# Patient Record
Sex: FEMALE | Race: WHITE | URBAN - METROPOLITAN AREA
[De-identification: names, ages, dates, MRNs, and addresses within clinical notes are randomized per-mention and may not be internally consistent; named-entity substitution may affect disease eponyms.]

---

## 2017-08-12 ENCOUNTER — OFFICE VISIT (OUTPATIENT)
Dept: URGENT CARE | Facility: URGENT CARE | Age: 47
End: 2017-08-12
Payer: COMMERCIAL

## 2017-08-12 VITALS
DIASTOLIC BLOOD PRESSURE: 75 MMHG | TEMPERATURE: 98.1 F | BODY MASS INDEX: 20.89 KG/M2 | HEIGHT: 66 IN | OXYGEN SATURATION: 99 % | SYSTOLIC BLOOD PRESSURE: 122 MMHG | WEIGHT: 130 LBS | HEART RATE: 87 BPM

## 2017-08-12 DIAGNOSIS — M54.41 ACUTE RIGHT-SIDED LOW BACK PAIN WITH RIGHT-SIDED SCIATICA: Primary | ICD-10-CM

## 2017-08-12 PROCEDURE — 99203 OFFICE O/P NEW LOW 30 MIN: CPT | Performed by: PHYSICIAN ASSISTANT

## 2017-08-12 RX ORDER — METHYLPREDNISOLONE 4 MG
TABLET, DOSE PACK ORAL
Qty: 21 TABLET | Refills: 0 | Status: SHIPPED | OUTPATIENT
Start: 2017-08-12

## 2017-08-12 RX ORDER — TRAMADOL HYDROCHLORIDE 50 MG/1
50-100 TABLET ORAL EVERY 6 HOURS PRN
Qty: 20 TABLET | Refills: 0 | Status: SHIPPED | OUTPATIENT
Start: 2017-08-12

## 2017-08-12 NOTE — NURSING NOTE
"Chief Complaint   Patient presents with     Urgent Care     Back Pain     c/o back pain for 1 day       Initial /75  Pulse 87  Temp 98.1  F (36.7  C) (Tympanic)  Ht 5' 6\" (1.676 m)  Wt 130 lb (59 kg)  LMP 07/29/2017  SpO2 99%  Breastfeeding? No  BMI 20.98 kg/m2 Estimated body mass index is 20.98 kg/(m^2) as calculated from the following:    Height as of this encounter: 5' 6\" (1.676 m).    Weight as of this encounter: 130 lb (59 kg).  Medication Reconciliation: complete   Janny Mohan.MA    "

## 2017-08-12 NOTE — MR AVS SNAPSHOT
"              After Visit Summary   2017    Lauryn Raya    MRN: [id number]           Patient Information     Date Of Birth          1970        Visit Information        Provider Department      2017 6:05 PM Vanessa Guillen PA-C [facility]        Today's Diagnoses     Acute right-sided low back pain with right-sided sciatica    -  1       Follow-ups after your visit        Who to contact     If you have questions or need follow up information about today's clinic visit or your schedule please contact [facility] directly at [phone].  Normal or non-critical lab and imaging results will be communicated to you by Moov cc.hart, letter or phone within 4 business days after the clinic has received the results. If you do not hear from us within 7 days, please contact the clinic through Moov cc.hart or phone. If you have a critical or abnormal lab result, we will notify you by phone as soon as possible.  Submit refill requests through coJuvo or call your pharmacy and they will forward the refill request to us. Please allow 3 business days for your refill to be completed.          Additional Information About Your Visit        MyChart Information     coJuvo lets you send messages to your doctor, view your test results, renew your prescriptions, schedule appointments and more. To sign up, go to [email or web address] . Click on \"Log in\" on the left side of the screen, which will take you to the Welcome page. Then click on \"Sign up Now\" on the right side of the page.     You will be asked to enter the access code listed below, as well as some personal information. Please follow the directions to create your username and password.     Your access code is: [email or web address]  Expires: 11/10/2017  6:42 PM     Your access code will  in 90 days. If you need help or a new code, please call your [facility] clinic or [phone].        Care EveryWhere ID     This is " "your Care EveryWhere ID. This could be used by other organizations to access your [facility] medical records  [id number]        Your Vitals Were     Pulse Temperature Height Last Period Pulse Oximetry Breastfeeding?    87 98.1  F (36.7  C) (Tympanic) 5' 6\" (1.676 m) 07/29/2017 99% No    BMI (Body Mass Index)                   20.98 kg/m2            Blood Pressure from Last 3 Encounters:   08/12/17 122/75   02/11/05 110/60    Weight from Last 3 Encounters:   08/12/17 130 lb (59 kg)   02/11/05 123 lb (55.8 kg)              Today, you had the following     No orders found for display         Today's Medication Changes          These changes are accurate as of: 8/12/17  6:42 PM.  If you have any questions, ask your nurse or doctor.               Start taking these medicines.        Dose/Directions    methylPREDNISolone 4 MG tablet   Commonly known as:  MEDROL DOSEPAK   Used for:  Acute right-sided low back pain with right-sided sciatica   Started by:  Vanessa Guillen PA-C        Follow package instructions   Quantity:  21 tablet   Refills:  0       traMADol 50 MG tablet   Commonly known as:  ULTRAM   Used for:  Acute right-sided low back pain with right-sided sciatica   Started by:  Vanessa Guillen PA-C        Dose:   mg   Take 1-2 tablets ( mg) by mouth every 6 hours as needed for pain maximum 8 tablet(s) per day   Quantity:  20 tablet   Refills:  0            Where to get your medicines      [id number] - [place], [place] - [address] AT [facility] [address]  [facility] [address], [place] [address]    Hours:  24-hours Phone:  [phone]     methylPREDNISolone 4 MG tablet         Some of these will need a paper prescription and others can be bought over the counter.  Ask your nurse if you have questions.     Bring a paper prescription for each of these medications     traMADol 50 MG tablet                Primary Care Provider    None " Specified       No primary provider on file.        Equal Access to Services     LAMINE SCHMITT : Hadii aad ku hadcynthiacalvin Dominguez, waalexandriada brandonhernanha, qarobertta sirishamacharli krause. So [facility] [phone].    ATENCIÓN: Si habla español, tiene a hawk disposición servicios gratuitos de asistencia lingüística. Llame al [phone].    We comply with applicable federal civil rights laws and [place] laws. We do not discriminate on the basis of race, color, national origin, age, disability sex, sexual orientation or gender identity.            Thank you!     Thank you for choosing [facility]  for your care. Our goal is always to provide you with excellent care. Hearing back from our patients is one way we can continue to improve our services. Please take a few minutes to complete the written survey that you may receive in the mail after your visit with us. Thank you!             Your Updated Medication List - Protect others around you: Learn how to safely use, store and throw away your medicines at www.disposemymeds.org.          This list is accurate as of: 8/12/17  6:42 PM.  Always use your most recent med list.                   Brand Name Dispense Instructions for use Diagnosis    methylPREDNISolone 4 MG tablet    MEDROL DOSEPAK    21 tablet    Follow package instructions    Acute right-sided low back pain with right-sided sciatica       PROZAC PO           traMADol 50 MG tablet    ULTRAM    20 tablet    Take 1-2 tablets ( mg) by mouth every 6 hours as needed for pain maximum 8 tablet(s) per day    Acute right-sided low back pain with right-sided sciatica

## 2017-08-12 NOTE — PROGRESS NOTES
HPI:  Lauryn is a 45 yo female who presents for lower right back pain with radiation down right leg into calf x yesterday.  Patient has been moving and lifting boxes. She is employed as a  and last night it was very painful to stand.  Pain bearable controllable last night with ibuprofen but worse today with radiation down leg.  Pain is both sharp and achy.  Pain is better sitting.  Worse when standing or getting up from sitting. Back is achy when lying down.  Denies numbness or tingling.    ROS:  See HPI      PE:  Vitals & nursing notes reviewed. .B/P: 122/75, T: 98.1, P: 87, R: Data Unavailable  Constitutional:  Alert, well nourished, well-developed, NAD  Back:  TTP on right lower lumbar paraspinal muscles.   Pain with standing from sitting  Calf strength:  5/5 BL  Tibialis Anterior strength:  5/5 BL  EHL strength:  4+/5 Rt;  5/5 LT  Leg Raise test:  (+) on right side   Negative on left  Heel /Toe walk:  No foot flop appreciated      ASSESSMENT:  (M54.41) Acute right-sided low back pain with sx of right-sided sciatica.  Comment: (+) right leg raise test and RT EHL showed some minor weakness compared to  LT.    Plan: methylPREDNISolone (MEDROL DOSEPAK) 4 MG         tablet, traMADol (ULTRAM) 50 MG tablet        ICE 20 minutes on, 1 hour off.  Ibuprofen 600-800 mg q 4-6 hrs PRN pain (take with food).  Save Tramadol for severe pain or pain at night.   Rest back for 1-2 days then begin gentle back stretching exercises. (Demonstrated to patient).  If sx do not improve, may require PT.  FU with PCP or ortho if no improvement in 7 days for further eval and possible imaging, sooner if worsens.

## 2017-08-16 ENCOUNTER — SURGERY - HEALTHEAST (OUTPATIENT)
Dept: SURGERY | Facility: HOSPITAL | Age: 47
End: 2017-08-16

## 2017-08-16 ENCOUNTER — ANESTHESIA - HEALTHEAST (OUTPATIENT)
Dept: SURGERY | Facility: HOSPITAL | Age: 47
End: 2017-08-16

## 2017-08-16 ENCOUNTER — HOSPITAL ENCOUNTER (INPATIENT)
Dept: MEDSURG UNIT | Facility: HOSPITAL | Age: 47
Discharge: NURSING, REHAB OR LONG-TERM CARE | End: 2017-08-20
Attending: ORTHOPAEDIC SURGERY | Admitting: INTERNAL MEDICINE
Payer: COMMERCIAL

## 2017-08-16 DIAGNOSIS — K21.9 GASTROESOPHAGEAL REFLUX DISEASE WITHOUT ESOPHAGITIS: ICD-10-CM

## 2017-08-16 DIAGNOSIS — D64.9 ANEMIA, UNSPECIFIED TYPE: ICD-10-CM

## 2017-08-16 DIAGNOSIS — D72.829 LEUKOCYTOSIS: ICD-10-CM

## 2017-08-16 DIAGNOSIS — E87.1 HYPONATREMIA: ICD-10-CM

## 2017-08-16 DIAGNOSIS — M00.9 SEPTIC ARTHRITIS (H): ICD-10-CM

## 2017-08-16 DIAGNOSIS — L03.90 CELLULITIS: ICD-10-CM

## 2017-08-16 DIAGNOSIS — M13.0 POLYARTHROPATHY: ICD-10-CM

## 2017-08-16 DIAGNOSIS — M00.9 SEPTIC JOINT (H): ICD-10-CM

## 2017-08-16 LAB
ATRIAL RATE - MUSE: 102 BPM
DIASTOLIC BLOOD PRESSURE - MUSE: NORMAL MMHG
INTERPRETATION ECG - MUSE: NORMAL
P AXIS - MUSE: 58 DEGREES
PR INTERVAL - MUSE: 158 MS
QRS DURATION - MUSE: 94 MS
QT - MUSE: 310 MS
QTC - MUSE: 404 MS
R AXIS - MUSE: 58 DEGREES
SYSTOLIC BLOOD PRESSURE - MUSE: NORMAL MMHG
T AXIS - MUSE: 54 DEGREES
VENTRICULAR RATE- MUSE: 102 BPM

## 2017-08-16 ASSESSMENT — MIFFLIN-ST. JEOR
SCORE: 1250.03
SCORE: 1250.03

## 2017-08-17 LAB
BSA FOR ECHO PROCEDURE: 1.67 M2
CV BLOOD PRESSURE: NORMAL MMHG
CV ECHO HEIGHT: 66 IN
CV ECHO WEIGHT: 133 LBS
HIV 1+2 AB+HIV1 P24 AG SERPL QL IA: NEGATIVE
LEFT VENTRICLE HEART RATE: 75 BPM
NUC REST DIASTOLIC VOLUME INDEX: 2128 LBS
NUC REST SYSTOLIC VOLUME INDEX: 66 IN

## 2017-08-20 LAB
BLD PROD TYP BPU: NORMAL
BLOOD EXPIRATION DATE: NORMAL
BLOOD TYPE: 6200
CODING SYSTEM: NORMAL
COMPONENT (HISTORICAL CONVERSION): NORMAL
CROSSMATCH: NORMAL
ISSUE DATE AND TIME: NORMAL
STATUS (HISTORICAL CONVERSION): NORMAL
UNIT ABO/RH (HISTORICAL CONVERSION): NORMAL
UNIT NUMBER: NORMAL

## 2017-08-21 ENCOUNTER — OFFICE VISIT - HEALTHEAST (OUTPATIENT)
Dept: GERIATRICS | Facility: CLINIC | Age: 47
End: 2017-08-21

## 2017-08-21 DIAGNOSIS — A41.9 SEPSIS, DUE TO UNSPECIFIED ORGANISM: ICD-10-CM

## 2017-08-21 DIAGNOSIS — R53.1 WEAKNESS: ICD-10-CM

## 2017-08-21 DIAGNOSIS — M00.171: ICD-10-CM

## 2017-08-21 DIAGNOSIS — G93.40 ACUTE ENCEPHALOPATHY: ICD-10-CM

## 2017-08-21 DIAGNOSIS — D50.9 IRON DEFICIENCY ANEMIA, UNSPECIFIED IRON DEFICIENCY ANEMIA TYPE: ICD-10-CM

## 2017-08-23 ENCOUNTER — OFFICE VISIT - HEALTHEAST (OUTPATIENT)
Dept: GERIATRICS | Facility: CLINIC | Age: 47
End: 2017-08-23

## 2017-08-23 DIAGNOSIS — D50.9 IRON DEFICIENCY ANEMIA, UNSPECIFIED IRON DEFICIENCY ANEMIA TYPE: ICD-10-CM

## 2017-08-23 DIAGNOSIS — A41.9 SEPSIS, DUE TO UNSPECIFIED ORGANISM: ICD-10-CM

## 2017-08-23 DIAGNOSIS — G93.40 ACUTE ENCEPHALOPATHY: ICD-10-CM

## 2017-08-23 DIAGNOSIS — R53.1 WEAKNESS: ICD-10-CM

## 2017-08-25 ENCOUNTER — RECORDS - HEALTHEAST (OUTPATIENT)
Dept: LAB | Facility: CLINIC | Age: 47
End: 2017-08-25

## 2017-08-25 ENCOUNTER — OFFICE VISIT - HEALTHEAST (OUTPATIENT)
Dept: GERIATRICS | Facility: CLINIC | Age: 47
End: 2017-08-25

## 2017-08-25 DIAGNOSIS — R53.1 WEAKNESS: ICD-10-CM

## 2017-08-25 DIAGNOSIS — E83.42 HYPOMAGNESEMIA: ICD-10-CM

## 2017-08-25 DIAGNOSIS — E46 MALNUTRITION (H): ICD-10-CM

## 2017-08-25 DIAGNOSIS — F51.02 ADJUSTMENT INSOMNIA: ICD-10-CM

## 2017-08-25 LAB
AST SERPL W P-5'-P-CCNC: 37 U/L (ref 0–40)
CREAT SERPL-MCNC: 0.81 MG/DL (ref 0.6–1.1)
GFR SERPL CREATININE-BSD FRML MDRD: >60 ML/MIN/1.73M2

## 2017-08-28 ENCOUNTER — RECORDS - HEALTHEAST (OUTPATIENT)
Dept: ADMINISTRATIVE | Facility: OTHER | Age: 47
End: 2017-08-28

## 2017-08-28 ENCOUNTER — OFFICE VISIT - HEALTHEAST (OUTPATIENT)
Dept: GERIATRICS | Facility: CLINIC | Age: 47
End: 2017-08-28

## 2017-08-28 DIAGNOSIS — D50.9 IRON DEFICIENCY ANEMIA, UNSPECIFIED IRON DEFICIENCY ANEMIA TYPE: ICD-10-CM

## 2017-08-28 DIAGNOSIS — M00.171: ICD-10-CM

## 2017-08-28 DIAGNOSIS — E46 MALNUTRITION (H): ICD-10-CM

## 2017-08-28 DIAGNOSIS — R53.1 WEAKNESS: ICD-10-CM

## 2017-08-30 ENCOUNTER — OFFICE VISIT - HEALTHEAST (OUTPATIENT)
Dept: GERIATRICS | Facility: CLINIC | Age: 47
End: 2017-08-30

## 2017-08-30 DIAGNOSIS — D64.9 ANEMIA, UNSPECIFIED TYPE: ICD-10-CM

## 2017-08-30 DIAGNOSIS — A41.9 SEPSIS, DUE TO UNSPECIFIED ORGANISM: ICD-10-CM

## 2017-08-30 DIAGNOSIS — F32.A DEPRESSION: ICD-10-CM

## 2017-08-30 DIAGNOSIS — E46 MALNUTRITION (H): ICD-10-CM

## 2017-08-30 DIAGNOSIS — M00.171: ICD-10-CM

## 2017-08-30 DIAGNOSIS — R53.1 WEAKNESS: ICD-10-CM

## 2017-08-30 DIAGNOSIS — E83.42 HYPOMAGNESEMIA: ICD-10-CM

## 2017-08-30 DIAGNOSIS — F51.02 ADJUSTMENT INSOMNIA: ICD-10-CM

## 2017-08-30 DIAGNOSIS — G93.40 ACUTE ENCEPHALOPATHY: ICD-10-CM

## 2017-09-01 ENCOUNTER — RECORDS - HEALTHEAST (OUTPATIENT)
Dept: LAB | Facility: CLINIC | Age: 47
End: 2017-09-01

## 2017-09-01 LAB
AST SERPL W P-5'-P-CCNC: 24 U/L (ref 0–40)
CREAT SERPL-MCNC: 1.03 MG/DL (ref 0.6–1.1)
GFR SERPL CREATININE-BSD FRML MDRD: 58 ML/MIN/1.73M2

## 2017-09-05 ENCOUNTER — AMBULATORY - HEALTHEAST (OUTPATIENT)
Dept: GERIATRICS | Facility: CLINIC | Age: 47
End: 2017-09-05

## 2017-09-18 ENCOUNTER — OFFICE VISIT - HEALTHEAST (OUTPATIENT)
Dept: INFECTIOUS DISEASES | Facility: CLINIC | Age: 47
End: 2017-09-18

## 2017-09-18 ENCOUNTER — COMMUNICATION - HEALTHEAST (OUTPATIENT)
Dept: INFECTIOUS DISEASES | Facility: CLINIC | Age: 47
End: 2017-09-18

## 2017-09-18 DIAGNOSIS — M00.271: ICD-10-CM

## 2017-09-18 DIAGNOSIS — D64.9 ANEMIA, UNSPECIFIED TYPE: ICD-10-CM

## 2017-09-18 LAB
IGA SERPL-MCNC: 12 MG/DL (ref 65–400)
IGA SERPL-MCNC: ABNORMAL MG/DL (ref 700–1700)
IGM SERPL-MCNC: 9 MG/DL (ref 60–280)

## 2017-09-19 ENCOUNTER — COMMUNICATION - HEALTHEAST (OUTPATIENT)
Dept: INFECTIOUS DISEASES | Facility: CLINIC | Age: 47
End: 2017-09-19

## 2017-09-19 LAB
ALBUMIN PERCENT: 19.8 % (ref 51–67)
ALBUMIN SERPL ELPH-MCNC: 2.7 G/DL (ref 3.2–4.7)
ALPHA 1 PERCENT: 2.1 % (ref 2–4)
ALPHA 2 PERCENT: 5.9 % (ref 5–13)
ALPHA1 GLOB SERPL ELPH-MCNC: 0.3 G/DL (ref 0.1–0.3)
ALPHA2 GLOB SERPL ELPH-MCNC: 0.8 G/DL (ref 0.4–0.9)
B-GLOBULIN SERPL ELPH-MCNC: 0.7 G/DL (ref 0.7–1.2)
BETA PERCENT: 4.9 % (ref 10–17)
GAMMA GLOB SERPL ELPH-MCNC: 9.3 G/DL (ref 0.6–1.4)
GAMMA GLOBULIN PERCENT: 67.3 % (ref 9–20)
M PROTEIN SERPL ELPH-MCNC: 8.9 G/DL
PATH ICD:: ABNORMAL
PROT PATTERN SERPL ELPH-IMP: ABNORMAL
PROT SERPL-MCNC: 13.8 G/DL (ref 6–8)
REVIEWING PATHOLOGIST: ABNORMAL

## 2017-09-20 ENCOUNTER — COMMUNICATION - HEALTHEAST (OUTPATIENT)
Dept: INFECTIOUS DISEASES | Facility: CLINIC | Age: 47
End: 2017-09-20

## 2017-10-27 ENCOUNTER — HOSPITAL ENCOUNTER (OUTPATIENT)
Dept: PHYSICAL THERAPY | Facility: CLINIC | Age: 47
Setting detail: THERAPIES SERIES
End: 2017-10-27
Attending: NURSE PRACTITIONER
Payer: MEDICAID

## 2017-10-27 PROCEDURE — 97140 MANUAL THERAPY 1/> REGIONS: CPT | Mod: GP | Performed by: PHYSICAL THERAPIST

## 2017-10-27 PROCEDURE — 97112 NEUROMUSCULAR REEDUCATION: CPT | Mod: GP | Performed by: PHYSICAL THERAPIST

## 2017-10-27 PROCEDURE — 97110 THERAPEUTIC EXERCISES: CPT | Mod: GP | Performed by: PHYSICAL THERAPIST

## 2017-10-27 PROCEDURE — 97162 PT EVAL MOD COMPLEX 30 MIN: CPT | Mod: GP | Performed by: PHYSICAL THERAPIST

## 2017-10-27 PROCEDURE — 40000360 ZZHC STATISTIC PT CANCER REHAB VISIT: Performed by: PHYSICAL THERAPIST

## 2017-10-28 NOTE — PROGRESS NOTES
[facility]        OUTPATIENT PHYSICAL THERAPY FUNCTIONAL EVALUATION  PLAN OF TREATMENT FOR OUTPATIENT REHABILITATION  (COMPLETE FOR INITIAL CLAIMS ONLY)  Patient's Last Name, First Name, M.I.  YOB: 1970  Lauryn Raya     Provider's Name   [facility]   Medical Record No.  [id number]     Start of Care Date:  10/27/17   Onset Date:  08/14/17   Type:     _X__PT   ____OT  ____SLP Medical Diagnosis:  multiple myeloma, LBP, shoulder pain and R ankle weakness     PT Diagnosis:  LB pain, weak R ankle and L shoulder, L shoulder decreased ROM and pain Visits from SOC:  1                              __________________________________________________________________________________  Plan of Treatment/Functional Goals:  ADL retraining, joint mobilization, neuromuscular re-education, ROM, strengthening, stretching, manual therapy, transfer training           GOALS  shoulder ROM  Increase active shoulder flexion to 140* to do overhead reaching  01/14/18    back pain  pt will report 50% decrease in back pain and spasms so she can sleep through night  01/15/18    home program  pt will be independent in home exercises for R foot, L shoulder and LB   01/16/18        Therapy Frequency:  3 times/week   Predicted Duration of Therapy Intervention:  4 weeks, then 1 x month x 2 months  July Lopez, PT                                    I CERTIFY THE NEED FOR THESE SERVICES FURNISHED UNDER        THIS PLAN OF TREATMENT AND WHILE UNDER MY CARE .             Physician Signature               Date    X_____________________________________________________                        Certification Date From:  10/27/17   Certification Date To:  01/25/18    Referring Provider:  Claribel Mckeon    Initial Assessment  See Epic Evaluation- Start of Care Date: 10/27/17

## 2017-10-28 NOTE — PROGRESS NOTES
" 10/27/17 1500   Quick Adds   Quick Adds Certification   Type of Visit Initial OP PT Evaluation   General Information   Start of Care Date 10/27/17   Referring Physician Claribel Mckeon   Orders Evaluate and Treat as Indicated   Order Date 10/19/17   Medical Diagnosis multiple myeloma,low back,  L shoulder and R foot pain   Onset of illness/injury or Date of Surgery 17   Precautions/Limitations (fracture risk due to cancer diagnosis)   Surgical/Medical history reviewed Yes   Pertinent history of current problem (include personal factors and/or comorbidities that impact the POC) moderate complexity: pain: in L shoulder and LB, decreased functional mobility, difficulty with supine to sit, sleeping impaired, new diagnosis of multiple myeloma.. Pt had LBP mid August, then hospitalized with infection in R foot and L shoulder that was removed surgically. Diagnosed with multiple myeloma 10/10/17 and having chemo 1xw. Unable to sleep at night due to back spasms; boyfriend/partner  last month unexpectedly.   Pertinent Visual History  Pt has difficult time sitting in one position due to intense pain   Prior level of function comment  at [employer], lifting 30#, worked 30-35 hrs/week   Diagnostic Tests X-ray;MRI;CT Scan   X-ray Results unremarkable   MRI Results unremarkable   CT Results unremarkable   Previous/Current Treatment Physical Therapy   Improvement after PT No   Current Community Support (lives w/ 4 yr old dtr, parents live w/her)   Patient role/Employment history Employed;Disabled   Living environment [facility]   Patient/Family Goals Statement \"more ROM with shoulder, do day to day stuff, less back pain and stronger R foot\"   Fall Risk Screen   Fall screen completed by PT   Per patient - Fall 2 or more times in past year? No   Per patient - Fall with injury in past year? No   Is patient a fall risk? No   System Outcome Measures   Outcome Measures Cancer Rehab   Pain   Patient currently in pain " "Yes   Pain location R side of LB   Pain rating 6-10/10   Pain description Sharp   Pain description comment gets spasms that wake her at night,sharp pain, \"catchess me and I almost fall\" down, worse with moving a lot of not moving for a hile.    Additional pain locations? Pain location 2;Pain location 3   Pain location 2 L shoulder   Pain rating 2 0-8/10   Pain location 3 R foot   Pain rating 3 weak   Vital Signs   Vital Signs Other Vital Signs   Other Vital Signs weight 117# (lost 15#), 5'5\"   Cognitive Status Examination   Orientation orientation to person, place and time   Level of Consciousness alert   Follows Commands and Answers Questions 100% of the time   Integumentary   Integumentary Comments small incisions from laparoscopy on ant/posterior shoudler and anterior R ankle   Range of Motion (ROM)   ROM Comment L shoulder flexion 65, abd 77, supine in 90* of abd: shoulder ER 85, IR 60, decreased R df, pf, and eversion   Strength   Strength Comments L shoulder 3-/5 in flexion and abduction, R ankle : all motions 4/5, trunk flexion 2/5   Transfer Skills   Transfer Comments supine to sit very slowly and with difficulty   Gait   Gait Comments slow, flexed lumbar   Planned Therapy Interventions   Planned Therapy Interventions ADL retraining;joint mobilization;neuromuscular re-education;ROM;strengthening;stretching;manual therapy;transfer training   Clinical Impression   Criteria for Skilled Therapeutic Interventions Met yes, treatment indicated   PT Diagnosis LB pain, weak R ankle and L shoulder, L shoulder decreased ROM and pain   Influenced by the following impairments weakness, decreased ROM   Functional limitations due to impairments unable to sleep, unable to lift > 1# with L UE,    Clinical Presentation Evolving/Changing   Clinical Presentation Rationale my professional judgement, pt's inability to sleep, walk > 200 feet w/o pain and lift shoulder   Clinical Decision Making (Complexity) Moderate complexity "   Therapy Frequency 3 times/week   Predicted Duration of Therapy Intervention (days/wks) 4 weeks   Risk & Benefits of therapy have been explained Yes   Patient, Family & other staff in agreement with plan of care Yes   Clinical Impression Comments Pt presents with new diagnosis of multiple myeloma, 2 month history of LBP and shoulder pain, decreased ROM, strength; gait and transfer difficulties   Education Assessment   Preferred Learning Style Listening;Reading   Barriers to Learning No barriers   GOALS   PT Eval Goals 1;2;3   Goal 1   Goal Identifier shoulder ROM   Goal Description Increase active shoulder flexion to 140* to do overhead reaching   Target Date 01/14/18   Goal 2   Goal Identifier back pain   Goal Description pt will report 50% decrease in back pain and spasms so she can sleep through night   Target Date 01/15/18   Goal 3   Goal Identifier home program   Goal Description pt will be independent in home exercises for R foot, L shoulder and LB    Target Date 01/16/18   Total Evaluation Time   Total Evaluation Time (Minutes) 30   Therapy Certification   Certification date from 10/27/17   Certification date to 01/25/18   Medical Diagnosis multiple myeloma, LBP, shoulder pain and R ankle weakness

## 2017-10-31 ENCOUNTER — HOSPITAL ENCOUNTER (OUTPATIENT)
Dept: PHYSICAL THERAPY | Facility: CLINIC | Age: 47
Setting detail: THERAPIES SERIES
End: 2017-10-31
Attending: NURSE PRACTITIONER
Payer: MEDICAID

## 2017-10-31 PROCEDURE — 40000360 ZZHC STATISTIC PT CANCER REHAB VISIT: Performed by: PHYSICAL THERAPIST

## 2017-10-31 PROCEDURE — 97112 NEUROMUSCULAR REEDUCATION: CPT | Mod: GP | Performed by: PHYSICAL THERAPIST

## 2017-10-31 PROCEDURE — 97110 THERAPEUTIC EXERCISES: CPT | Mod: GP | Performed by: PHYSICAL THERAPIST

## 2017-10-31 PROCEDURE — 97140 MANUAL THERAPY 1/> REGIONS: CPT | Mod: GP | Performed by: PHYSICAL THERAPIST

## 2017-11-01 ENCOUNTER — HOSPITAL ENCOUNTER (OUTPATIENT)
Dept: PHYSICAL THERAPY | Facility: CLINIC | Age: 47
Setting detail: THERAPIES SERIES
End: 2017-11-01
Attending: NURSE PRACTITIONER
Payer: COMMERCIAL

## 2017-11-01 PROCEDURE — 40000360 ZZHC STATISTIC PT CANCER REHAB VISIT: Performed by: PHYSICAL THERAPIST

## 2017-11-01 PROCEDURE — 97110 THERAPEUTIC EXERCISES: CPT | Mod: GP | Performed by: PHYSICAL THERAPIST

## 2017-11-01 PROCEDURE — 97140 MANUAL THERAPY 1/> REGIONS: CPT | Mod: GP | Performed by: PHYSICAL THERAPIST

## 2017-11-01 PROCEDURE — 97112 NEUROMUSCULAR REEDUCATION: CPT | Mod: GP | Performed by: PHYSICAL THERAPIST

## 2017-11-02 NOTE — ADDENDUM NOTE
Encounter addended by: July Lopez PT on: 11/2/2017 10:51 AM<BR>     Actions taken: Flowsheet accepted

## 2017-11-02 NOTE — PROGRESS NOTES
11/01/17 1200   Signing Clinician's Name / Credentials   Signing clinician's name / credentials Vanesazachariahnatanael PT/CLT ELIAN   Session Number   Session Number 3   Goal 1   Goal Identifier shoulder ROM   Goal Description Increase active shoulder flexion to 140* to do overhead reaching   Target Date 01/14/18   Goal 2   Goal Identifier back pain   Goal Description pt will report 50% decrease in back pain and spasms so she can sleep through night   Target Date 01/15/18   Goal 3   Goal Identifier home program   Goal Description pt will be independent in home exercises for R foot, L shoulder and LB    Target Date 01/16/18   Subjective Report   Subjective Report Back pain has decereased, but she is also taking pain meds closer to bedtime, which helps. Shoulder pain has decreased slightly, but functional activities of shoulder and back remains about the same.   Objective Measure 1   Objective Measure lumbar ROM   Details flexion: initially -75%, now -60%   Objective Measure 2   Objective Measure shoulder flexion   Details standing 70*, supine aarom 120*   Therapeutic Procedure/exercise   Minutes 25   Skilled Intervention performed supine wand shoulder flexion, scapular depression and retraction, standing lumbar extn,, standing wand shoulder flexion. instructed in mini squat, toe/heel raise , cues for scapular retraction and depression with shoulder elevation, pull downs with yellow tband.   Patient Response improved ROM with better scapular position   Neuromuscular Re-education   Minutes 24   Skilled Intervention Pt educated on pain and how it is affected by stress in relationships, general sensitivity level, worry, failed treatments, depression and anxiety; how it is a decision by the brain about whether the body is in danger, how the firing level of nerves is affected by the above and how you may have less room for activity before your nerves alert your body to pain. Encouraged activity and any attempts to decrease stress.    Patient Response interested   Manual Therapy   Minutes 10   Skilled Intervention MFR to R QL and lumbar paraspinals, ant/posterior shoulder , pecs, teres minor. removed k-tape. AAROM of shoulder w/post/inferior glide.pt states transfer belt to pelvis has helped with stability. Will continue to use.    Patient Response increased shoulder ROM in supine   Education   Learner Patient   Readiness Acceptance;Eager   Method Booklet/handout;Literature;Explanation;Demonstration   Response Verbalizes Understanding;Needs Reinforcement   Education Comments pain education   Plan   Homework above ex, posture   Home program wear belt on pelvis   Updates to plan of care valid   Plan for next session relaxation,ex for R foot,  ktape, shoulder and LB rom and strengthening, ROMA, posture,    Total Session Time   Timed Code Treatment Minutes 59   Total Treatment Time (sum of timed and untimed services) 59   AMBULATORY CLINICS ONLY-MEDICAL AND TREATMENT DIAGNOSIS   Medical Diagnosis multiple myeloma,low back,  L shoulder and R foot pain   PT Diagnosis LB pain, weak R ankle and L shoulder, L shoulder decreased ROM and pain

## 2017-11-07 ENCOUNTER — HOSPITAL ENCOUNTER (OUTPATIENT)
Dept: PHYSICAL THERAPY | Facility: CLINIC | Age: 47
Setting detail: THERAPIES SERIES
End: 2017-11-07
Attending: NURSE PRACTITIONER
Payer: COMMERCIAL

## 2017-11-07 PROCEDURE — 40000360 ZZHC STATISTIC PT CANCER REHAB VISIT: Performed by: PHYSICAL THERAPIST

## 2017-11-07 PROCEDURE — 97110 THERAPEUTIC EXERCISES: CPT | Mod: GP | Performed by: PHYSICAL THERAPIST

## 2017-11-07 PROCEDURE — 97140 MANUAL THERAPY 1/> REGIONS: CPT | Mod: GP | Performed by: PHYSICAL THERAPIST

## 2017-11-07 ASSESSMENT — 6 MINUTE WALK TEST (6MWT): TOTAL DISTANCE WALKED (METERS): 375.5

## 2017-11-14 ENCOUNTER — HOSPITAL ENCOUNTER (OUTPATIENT)
Dept: PHYSICAL THERAPY | Facility: CLINIC | Age: 47
Setting detail: THERAPIES SERIES
End: 2017-11-14
Attending: NURSE PRACTITIONER
Payer: COMMERCIAL

## 2017-11-14 PROCEDURE — 40000360 ZZHC STATISTIC PT CANCER REHAB VISIT: Performed by: PHYSICAL THERAPIST

## 2017-11-14 PROCEDURE — 97140 MANUAL THERAPY 1/> REGIONS: CPT | Mod: GP | Performed by: PHYSICAL THERAPIST

## 2017-11-14 PROCEDURE — 97110 THERAPEUTIC EXERCISES: CPT | Mod: GP | Performed by: PHYSICAL THERAPIST

## 2017-11-16 ENCOUNTER — HOSPITAL ENCOUNTER (OUTPATIENT)
Dept: PHYSICAL THERAPY | Facility: CLINIC | Age: 47
Setting detail: THERAPIES SERIES
End: 2017-11-16
Attending: NURSE PRACTITIONER
Payer: COMMERCIAL

## 2017-11-16 PROCEDURE — 97110 THERAPEUTIC EXERCISES: CPT | Mod: GP | Performed by: PHYSICAL THERAPIST

## 2017-11-16 PROCEDURE — 40000360 ZZHC STATISTIC PT CANCER REHAB VISIT: Performed by: PHYSICAL THERAPIST

## 2017-11-16 PROCEDURE — 97112 NEUROMUSCULAR REEDUCATION: CPT | Mod: GP | Performed by: PHYSICAL THERAPIST

## 2017-11-21 ENCOUNTER — HOSPITAL ENCOUNTER (OUTPATIENT)
Dept: PHYSICAL THERAPY | Facility: CLINIC | Age: 47
Setting detail: THERAPIES SERIES
End: 2017-11-21
Attending: NURSE PRACTITIONER
Payer: COMMERCIAL

## 2017-11-21 PROCEDURE — 40000360 ZZHC STATISTIC PT CANCER REHAB VISIT: Performed by: PHYSICAL THERAPIST

## 2017-11-21 PROCEDURE — 97140 MANUAL THERAPY 1/> REGIONS: CPT | Mod: GP | Performed by: PHYSICAL THERAPIST

## 2017-11-21 PROCEDURE — 97110 THERAPEUTIC EXERCISES: CPT | Mod: GP | Performed by: PHYSICAL THERAPIST

## 2017-11-29 ENCOUNTER — HOSPITAL ENCOUNTER (OUTPATIENT)
Dept: PHYSICAL THERAPY | Facility: CLINIC | Age: 47
Setting detail: THERAPIES SERIES
End: 2017-11-29
Attending: NURSE PRACTITIONER
Payer: COMMERCIAL

## 2017-11-29 PROCEDURE — 97140 MANUAL THERAPY 1/> REGIONS: CPT | Mod: GP | Performed by: PHYSICAL THERAPIST

## 2017-11-29 PROCEDURE — 97110 THERAPEUTIC EXERCISES: CPT | Mod: GP | Performed by: PHYSICAL THERAPIST

## 2017-11-29 PROCEDURE — 40000360 ZZHC STATISTIC PT CANCER REHAB VISIT: Performed by: PHYSICAL THERAPIST

## 2017-11-30 ENCOUNTER — HOSPITAL ENCOUNTER (OUTPATIENT)
Dept: PHYSICAL THERAPY | Facility: CLINIC | Age: 47
Setting detail: THERAPIES SERIES
End: 2017-11-30
Attending: NURSE PRACTITIONER
Payer: COMMERCIAL

## 2017-11-30 PROCEDURE — 97140 MANUAL THERAPY 1/> REGIONS: CPT | Mod: GP | Performed by: PHYSICAL THERAPIST

## 2017-11-30 PROCEDURE — 97110 THERAPEUTIC EXERCISES: CPT | Mod: GP | Performed by: PHYSICAL THERAPIST

## 2017-11-30 PROCEDURE — 40000360 ZZHC STATISTIC PT CANCER REHAB VISIT: Performed by: PHYSICAL THERAPIST

## 2017-12-05 ENCOUNTER — HOSPITAL ENCOUNTER (OUTPATIENT)
Dept: PHYSICAL THERAPY | Facility: CLINIC | Age: 47
Setting detail: THERAPIES SERIES
End: 2017-12-05
Attending: NURSE PRACTITIONER
Payer: COMMERCIAL

## 2017-12-05 PROCEDURE — 40000360 ZZHC STATISTIC PT CANCER REHAB VISIT: Performed by: PHYSICAL THERAPIST

## 2017-12-05 PROCEDURE — 97110 THERAPEUTIC EXERCISES: CPT | Mod: GP | Performed by: PHYSICAL THERAPIST

## 2017-12-05 NOTE — PROGRESS NOTES
"Outpatient Physical Therapy Progress Note     Patient: Lauryn Raya  : 1970    Beginning/End Dates of Reporting Period:  10/27/17 to 2017    Referring Provider: Claribel Nobles Diagnosis: R foot and L shoulder pain, L shoulder decreased ROM, strength, LBP, decreased functional activity     Client Self Report:  No pain, pleased with progress.     Objective Measurements:  Objective Measure: lumbar ROM  Details: improved , 5\" from floor with flexion  Objective Measure: shoulder standing flexion  Details: 143            Outcome Measures (most recent score):            Goals:  Goal Identifier shoulder ROM   Goal Description Increase active shoulder flexion to 140* to do overhead reaching   Target Date 18   Date Met      Progress:good progress     Goal Identifier back pain   Goal Description pt will report 50% decrease in back pain and spasms so she can sleep through night   Target Date 01/15/18   Date Met   17   Progress:     Goal Identifier home program   Goal Description pt will be independent in home exercises for R foot, L shoulder and LB    Target Date 18   Date Met      Progress:progressing well       Progress Toward Goals:   Progress this reporting period: Lauryn has made good progress with increased shoulder and LB ROM and increased strngth. She will be seen 1more visit and coutcome measures will be assessed at that time.       Plan:  Continue therapy per current plan of care.    Discharge:  No  "

## 2017-12-12 ENCOUNTER — HOSPITAL ENCOUNTER (OUTPATIENT)
Dept: PHYSICAL THERAPY | Facility: CLINIC | Age: 47
Setting detail: THERAPIES SERIES
End: 2017-12-12
Attending: NURSE PRACTITIONER
Payer: COMMERCIAL

## 2017-12-12 PROCEDURE — 40000360 ZZHC STATISTIC PT CANCER REHAB VISIT: Performed by: PHYSICAL THERAPIST

## 2017-12-12 PROCEDURE — 97110 THERAPEUTIC EXERCISES: CPT | Mod: GP | Performed by: PHYSICAL THERAPIST

## 2017-12-12 ASSESSMENT — 6 MINUTE WALK TEST (6MWT): TOTAL DISTANCE WALKED (METERS): 476

## 2021-07-20 VITALS
WEIGHT: 130 LBS | BODY MASS INDEX: 19.69 KG/M2 | BODY MASS INDEX: 21.38 KG/M2 | BODY MASS INDEX: 20.98 KG/M2 | HEIGHT: 66 IN | WEIGHT: 130 LBS | WEIGHT: 133 LBS | WEIGHT: 122 LBS | BODY MASS INDEX: 20.11 KG/M2 | WEIGHT: 130.4 LBS | BODY MASS INDEX: 21.38 KG/M2 | BODY MASS INDEX: 20.98 KG/M2 | BODY MASS INDEX: 21.05 KG/M2 | WEIGHT: 133 LBS | HEIGHT: 66 IN | WEIGHT: 124.6 LBS

## 2021-07-20 PROBLEM — F51.02 ADJUSTMENT INSOMNIA: Status: ACTIVE | Noted: 2017-08-25

## 2021-07-20 PROBLEM — M13.0 POLYARTHROPATHY: Status: ACTIVE | Noted: 2017-08-16

## 2021-07-20 PROBLEM — M00.9 SEPTIC ARTHRITIS (H): Status: ACTIVE | Noted: 2017-08-16

## 2021-07-20 NOTE — PROGRESS NOTES
RUTHIE received update from MD that pt is stable for d/c today. RUTHIE met with pt and confirmed plan. Pt is agreeable. RUTHIE provided education on process of TCU in regards to cares and treatment planning. Pt requires IV ABX for 2 weeks therefore it is likely she would stay for that dosing.  Pt's parents to  and transport to TCU.     CMA updated [facility] regarding pt's IV ABX need at d/c    Consult with MD and bedside RN. Pt has another IV ABX dosing that can be given prior to d/c. Goal of d/c around 12:30-1pm    RUTHIE updated pt's parents who were present in room regarding d/c plan. Parents had questions related to cares following d/c. Advised that the TCU would coordinate all cares or any placement changes that are requested. Parents plan to transport pt to [facility] today.      RUTHIE Raymundo LGSW 8/20/2017 11:33 AM

## 2021-07-20 NOTE — PROGRESS NOTES
"Pharmacy Consult: Vancomycin Dosing    Pharmacist consulted to dose vancomycin for Lauryn Raya, a 46 y.o. female.    Ordering provider: Dr Starks    Indication for vancomycin therapy: septic shoulder and ankle    Goal Trough Range:  10-20 mcg/mL based on indication    Other current antimicrobials             cefTRIAXone 2 g in NaCl 0.9 % 50 mL (MINI-BAG Plus) (ROCEPHIN)  Every 24 hours          vancomycin 1,000 mg in dextrose 5% 250 mL (VANCOCIN)  Every 12 hours             Subjective/Objective:    Patient was admitted for Septic arthritis on 8/16/2017    Height: 5' 6\" (1.676 m)    Actual Body Weight (ABW): 60.3 kg (133 lb)    Ideal body weight: 59.3 kg (130 lb 11.7 oz)  Adjusted ideal body weight: 59.7 kg (131 lb 10.2 oz)    BMI: Body mass index is 21.47 kg/(m^2).    No Known Allergies    Patient Active Problem List   Diagnosis     Polyarthropathy     Septic arthritis     Hyponatremia     Hypokalemia     Acute encephalopathy     Non-traumatic rhabdomyolysis     Sepsis, due to unspecified organism     Anemia, unspecified type     Hypomagnesemia    Past Medical History:   Diagnosis Date     Anxiety      Depression      Septic arthritis         Temp Readings from Current Encounter:     08/17/17 1228 08/17/17 1527 08/17/17 2020   Temp: 98.5  F (36.9  C) 98.4  F (36.9  C) 97.8  F (36.6  C)     Net Intake/Output (last 24 hours):  I/O last 3 completed shifts:  In: 4400 [P.O.:960; I.V.:2670; IV Piggyback:770]  Out: 1510 [Urine:1500; Blood:10]    Recent Labs      08/16/17   0503  08/16/17   2121  08/17/17   0552  08/17/17   1217   WBC  16.5*   --   9.0  8.9   LACTICACID  1.2   --    --    --    CRP  20.9*  19.5*  19.4*   --    BUN  15   --   14   --    CREATININE  0.86   --   0.76   --      Estimated Creatinine Clearance: 89.9 mL/min (by C-G formula based on Cr of 0.76).    Recent Labs      08/16/17   0526  08/16/17   0653  08/16/17   0953  08/16/17   1409   CULTURE  Alpha Strep to be Identified*  Alpha Strep to be " Identified*  4+ Alpha Strep to be Identified*  No growth to date       Results for orders placed or performed during the hospital encounter of 08/16/17   Culture/Gram Stain: Body Fluid    Collection Time: 08/16/17  2:09 PM   Result Value Status    Culture No growth to date Preliminary   Culture and Gram Stain, Drainage    Collection Time: 08/16/17  9:53 AM   Result Value Status    Culture 4+ Alpha Strep to be Identified (!) Preliminary   Culture, Blood Positive Work-up    Collection Time: 08/16/17  6:53 AM   Result Value Status    Culture Alpha Strep to be Identified (!) Preliminary   Culture, Blood Positive Work-up    Collection Time: 08/16/17  5:26 AM   Result Value Status    Culture Alpha Strep to be Identified (!) Preliminary       Recent labs: (last 7 days)      08/17/17   2132   VANCOMYCIN  16.4       Vancomycin administrations: (last 120 hours)     Date/Time Action Medication Dose Rate    08/17/17 2159 New Bag    vancomycin 1,000 mg in dextrose 5% 250 mL (VANCOCIN) 1,000 mg 135 mL/hr    08/17/17 1008 New Bag    vancomycin 1,000 mg in dextrose 5% 250 mL (VANCOCIN) 1,000 mg 135 mL/hr    08/16/17 2300 New Bag    vancomycin 1,000 mg in dextrose 5% 250 mL (VANCOCIN) 1,000 mg 135 mL/hr    08/16/17 1256 New Bag    vancomycin 1 g in dextrose 5% 250 mL (VANCOCIN) 1 g 135 mL/hr          Assessment/Plan:    Pharmacist consulted to dose vancomycin for septic shoulder and ankle, goal trough range 10-20 mcg/mL.  1. Continue vancomycin 1000 mg IV every 12 hours (16 mg/kg actual body weight).  2. Vancomycin trough level of 16.4 mcg/mL was within the goal trough range. This trough level was drawn at steady state.  3. Pharmacist will plan to re-check a vancomycin trough level before 5th or 6th dose.  4. Pharmacist will continue to follow.    Thank you for the consult.  Raegan Wyatt RPh 8/17/2017 10:11 PM

## 2021-07-20 NOTE — ADDENDUM NOTE
Addendum Note by Edenilson Lopez NP at 8/31/2017  7:44 AM     Author: Edenilson Lopez NP Service: -- Author Type: Nurse Practitioner    Filed: 8/31/2017  7:44 AM Encounter Date: 8/30/2017 Status: Signed    : Edenilson Lopez NP (Nurse Practitioner)    Addended by: ESTEFANI LOPEZ on: 8/31/2017 07:44 AM        Modules accepted: Yoel

## 2021-07-20 NOTE — PROGRESS NOTES
Progress Note    Assessment/Plan  46 years old female  with past medical history of depression and chronic back pain who presents  to the emergency room for evaluation of bilateral shoulder pain, right hip pain and right foot pain.  She was admitted with      Multiple joint pain with right ankle septic joint  - Etiology is unclear, patient denies IV drug abuse  - Orthopedic consult is appreciated  - S/P incision and drainage of right ankle and left shoulder  - Empiric antibiotic with vancomycin and Zosyn  - Negative chlamydia and gonorrhea and HIV  - Positive blood culture for strep pneumonia  - ID consult is appreciated  - Negative transesophageal echo  for endocarditis      Sepsis   - Source is probably septic joints  - Positive blood culture for strep pneumonia  - Continue IV fluid and antibiotics  - ID consult is appreciated  - 2 weeks of IV antibiotics as per ID  - Place PICC line yesterday       Hyponatremia  - Secondary to hypovolemia  - Improving with IV fluid but still on the low side  - Continue to monitor sodium level  - CT head is unremarkable for acute changes      Rhabdomyolysis  - Etiology is unclear  - Resolved  - D/Continue IV fluids      Acute encephalopathy  - Metabolic, secondary to the above  - Patient is more awake and today after D/C scheduled narcotics  - Continue supportive care  - Delirium order set  - CT head is unremarkable for acute changes      Anemia  - Acute on chronic  - Part is hemodilution secondary to aggressive IV hydration  - Drop Hb today to 6.9  - Low iron studies, normal vitamin B12, folic acid, and negative Hemoccult blood  - Start IV iron  - Transfuse one unit of blood  - Continue to monitor Hb      Chronic back pain  - MRI is negative for osteomyelitis/discitis  - Pain control with as needed oxycodone  - Discontinue scheduled oxycodone as patient is very lethargic  - PT evaluation once medically      Hypokalemia  - Replace per protocol      Hypomagnesemia  - Replace per  protocol      Depression  - Resume home medications    Malnutrition   - Moderate   - Start supplement      Weakness and deconditioning  - Secondary to the above  - PT/OT evaluation  - Plan for TCU on discharge      Code status :  Full code      Discussed with family , patient, ID , nursing staff and discharge planner       Disposition Plan: Probably TCU  Barriers to Discharge: blood transfusion , IV antibiotics    Principal Problem:    Septic arthritis  Active Problems:    Polyarthropathy    Sepsis, due to unspecified organism    Anemia, unspecified type    Hypomagnesemia    Iron deficiency anemia    Weakness      Subjective  Lauryn is feeling better today, more awake and engaging in conversation, denies any pain or shortness of breath, no fever or chills , better oral intake, mild ankle pain , still feeling weak     Objective    Vital signs in last 24 hours  Temp:  [97.9  F (36.6  C)-98.7  F (37.1  C)] 98.7  F (37.1  C)  Heart Rate:  [83-98] 83  Resp:  [16-18] 18  BP: (118-146)/(73-88) 131/79    Wt Readings from Last 3 Encounters:   08/16/17 1845 133 lb (60.3 kg)   08/16/17 0450 130 lb (59 kg)       Intake/Output last 3 shifts  I/O last 3 completed shifts:  In: 2767 [P.O.:960; I.V.:1807]  Out: -   Intake/Output this shift:       Review of Systems   A 12 point comprehensive review of systems was negative except as noted.    Physical Exam  General: Well developed , sitting comfortable on bed , No apparent distress  Head: Normocephalic, atraumatic  Eyes: Pupils equal, round and reactive to light   Mouth: Mucus membranes moist  Neck: Supple, No JVD  Cardiovascular: Regular rate and rhythm, Normal S1, S2  Lungs: Clear to auscultation bilaterally, no wheeze.  Abdomen: Soft, Non-tender, not distended, Bowel sounds present  Extremities: No edema, no clubbing, dressing applied to right ankle and left shoulder  Skin: Warm and well-perfused without lesions.  Neurologic: lethargic,  face is symmetric, Moves all extremities  equally  Psychiatry : appropriate with examiner           Pertinent Labs   Lab Results: personally reviewed.   Lab Results   Component Value Date     (L) 08/19/2017     (L) 08/18/2017     (L) 08/17/2017    K 4.0 08/19/2017    K 3.7 08/18/2017    K 3.5 08/18/2017    CO2 24 08/19/2017    CO2 26 08/18/2017    CO2 25 08/17/2017    BUN 7 (L) 08/19/2017    BUN 12 08/18/2017    BUN 14 08/17/2017    CREATININE 0.63 08/19/2017    CREATININE 0.66 08/18/2017    CREATININE 0.76 08/17/2017    CALCIUM 7.7 (L) 08/19/2017    CALCIUM 7.9 (L) 08/18/2017    CALCIUM 9.6 08/17/2017     Lab Results   Component Value Date    WBC 9.6 08/19/2017    WBC 8.0 08/18/2017    WBC 8.9 08/17/2017    HGB 6.9 (LL) 08/19/2017    HGB 7.2 (L) 08/18/2017    HGB 7.3 (L) 08/17/2017    HCT 20.0 (L) 08/19/2017    HCT 21.4 (L) 08/18/2017    HCT 21.8 (L) 08/17/2017    MCV 95 08/19/2017    MCV 98 08/18/2017    MCV 97 08/17/2017     (L) 08/19/2017     (L) 08/18/2017     (L) 08/17/2017           Advanced Care Planning  Discharge Planning discussed with patient  Total time 35 minutes with >50% spent in counseling regarding plan and coordinating care.      Paulino Starks MD  Hospitalist  Pager [phone]

## 2021-07-20 NOTE — ADDENDUM NOTE
Addendum Note by Hilario Rubio MD at 9/18/2017  2:33 PM     Author: Hilario Rubio MD Service: -- Author Type: Physician    Filed: 9/18/2017  2:33 PM Encounter Date: 9/18/2017 Status: Signed    : Hilario Rubio MD (Physician)    Addended by: HILARIO RUBIO on: 9/18/2017 02:33 PM        Modules accepted: Orders

## 2021-07-20 NOTE — ED PROVIDER NOTES
Patient received in signout from Dr. Manriquez.  She was being seen for joint pain in the bilateral shoulders, hips, and right ankle and had a leukocytosis of 16, significantly elevated inflammatory markers, labs that resulted and showed hyponatremia with a sodium of 120, and was awaiting a CT scan of the ankle.  CT scan resulted and showed findings that did not have an abscess or obvious source, and only appeared to look like cellulitis per the radiologist.  This did not fit completely with the patient's clinical picture and there was still concerned that something more was going on.  I then called and discussed the case with Dr. Banuelos of [facility], and the decision was made to attempt an arthrocentesis given that she had inability to bear weight, leukocytosis, and pain with range of motion.  It was initially performed on the right ankle as this was the joint that was most painful.  Per radiology, the aspiration was complete purulence and had greater than 349,000 white blood cells.  This is consistent with a septic joint.  While she was awaiting this study in the emergency department, she experienced acute urinary retention and was unable to urinate even though a bladder scan showed almost 700 cc in her bladder.  This then may be worried about something like an epidural abscess or spinal cord involvement of infection, so I did order MRIs of her thoracic and lumbar spine and held her in the emergency department here for results as she would have needed transfer for spine surgery if these had been positive.  Luckily, the spine is not involved and these images were negative.  Elizabeth catheter was placed here in the ED.  By the time all of this resulted, the patient was starting to look more and more ill and more toxic.  She did spike a fever and we gave rectal Tylenol and kept her n.p.o.  She also started to have some delirium and slight confusion.  She was tachycardic to about 115 at the time she went directly  to the operating room, but blood pressures were remaining stable.  Dr. Banuelos evaluated her directly at bedside, and as she has pain in multiple joints, he was also concerned about her left shoulder.  I then talked with the afternoon radiologist and sent her down for a stat arthrocentesis of the left shoulder.  Her report, this fluid looked cloudy as well.  I discussed all of the results once again with Dr. Banuelos, and the patient will now be taken directly to the OR for operative management.  We do suspect that she is becoming septic at this point and after the first arthrocentesis I ordered broad-spectrum antibiotics with vancomycin and Zosyn.  They were not given prior as we did not want to affect the culture, and the patient at that time had still been well-appearing and was afebrile.  I also discussed the case with hospitalist Dr. Starks, and now that all of the imaging is finally complete with a picture of multiple septic joints, she will be admitted straight to the OR for operative washout.  Clinically, I suspect something like endocarditis with multiple septic emboli or gonorrhea in the setting of the fact that she states that she was recently treated for an STD.  She is not an IV drug user and has not had any joint injections.  Her clinical course is tenuous and she is now taken directly to the OR.    Critical Care  Performed by: SHAE BAGLEY  Authorized by: KATY ARAUJO   Total critical care time: 90 minutes  Critical care time was exclusive of separately billable procedures and treating other patients.  Critical care was necessary to treat or prevent imminent or life-threatening deterioration of the following conditions: sepsis (multiple septic joints).  Critical care was time spent personally by me on the following activities: blood draw for specimens, development of treatment plan with patient or surrogate, discussions with consultants, evaluation of patient's response to treatment,  examination of patient, obtaining history from patient or surrogate, ordering and performing treatments and interventions, ordering and review of laboratory studies, ordering and review of radiographic studies and re-evaluation of patient's condition (Multiple re-evaluations as the patient became sicker, IV antibiotic therapy, frequent conversations with orthopedics, multiple radiologists, hospitalist, MRI evaluation, management of acute urinary retention,).             Chantel Martinez MD  08/16/17 0259

## 2021-07-20 NOTE — CONSULTS
Consultation - [place] [facility]  Lauryn Raya,  1970, MRN [id number]    [facility]  Polyarthropathy [M13.0]  Septic arthritis [M00.9]  Cellulitis [L03.90]  Hyponatremia [E87.1]  Leukocytosis [D72.829]  Septic joint [M00.9]    PCP: No Primary Care Provider, None   Code status:  Full Code       Extended Emergency Contact Information  Primary Emergency Contact: Olegario Hudson   [facility]  Home Phone: [phone]  Relation: Friend       Assessment and Plan   Principal Problem:    Septic arthritis  Active Problems:    Polyarthropathy    Sepsis, due to unspecified organism    Anemia, unspecified type    Hypomagnesemia    Impression: Alpha-hemolytic strep bacteremia and septic arthritis of the right ankle.  Transesophageal echocardiography was negative for vegetations.    Recommendations: Change antibiotic chemotherapy to ceftriaxone and vancomycin.  Would anticipate at least 2 weeks of parenteral antimicrobial chemotherapy.    Would recommend checking HIV antibody.    Thank you for consulting [place] [facility]    Hilario Rubio MD  [phone]     Chief Complaint Septic arthritis       HPI   We have been requested by Dr. Paulino Starks to evaluate Lauryn Raya for right ankle septic arthritis and bacteremia who is a 46 y.o. year old female.    Patient is a pleasant 46-year-old woman who was admitted to the hospital yesterday with right ankle pain and fevers.  She says she has been feeling ill for several weeks.  Cultures obtained yesterday are growing gram-positive cocci in pairs and clusters.  Right ankle aspirate is growing alpha strep.  Patient also underwent I&D of left shoulder which has PMNs but no organisms seen.    Interestingly, patient's boyfriend is currently being treated for a staph infection with parenteral antibiotics.    Patient denies having previous infections.       Medical History  Active Ambulatory  (Non-Hospital) Problems    Diagnosis     Hyponatremia     Hypokalemia     Acute encephalopathy     Non-traumatic rhabdomyolysis     Past Medical History:   Diagnosis Date     Anxiety      Depression      Septic arthritis     Surgical History  She  has no past surgical history on file.   Social History  Reviewed, and she  reports that she has never smoked. She has never used smokeless tobacco. She reports that she drinks alcohol. She reports that she does not use illicit drugs.   Allergies  No Known Allergies Family History  Reviewed and noncontributory, and family history is not on file.   Psychosocial Needs  Social History     Social History Narrative     No narrative on file     Additional psychosocial needs reviewed per nursing assessment.     Prior to Admission Medications   Prescriptions Prior to Admission   Medication Sig Dispense Refill Last Dose     busPIRone (BUSPAR) 10 MG tablet Take 20 mg by mouth 2 (two) times a day.   2017 at am     FLUoxetine (PROZAC) 40 MG capsule Take 40 mg by mouth daily.   Past Week     gabapentin (NEURONTIN) 100 MG capsule Take 100-200 mg by mouth at bedtime as needed (for nerve pain).   Past Week     ibuprofen (ADVIL,MOTRIN) 200 MG tablet Take 400 mg by mouth every 6 (six) hours as needed for pain.   8/15/2017 at pm     [] methylPREDNISolone (MEDROL DOSEPACK) 4 mg tablet Take 4 mg by mouth daily. follow package directions   2017 at am     rizatriptan (MAXALT) 10 MG tablet Take 10 mg by mouth as needed for migraine (may repeat x1 in 2 hours, maximum of 3 tablets/24 hours). May repeat in 2 hours if needed   Past Week     traMADol (ULTRAM) 50 mg tablet Take  mg by mouth every 6 (six) hours as needed for pain.   2017 at am          Review of Systems:  Pertinent items are noted in HPI., otherwise all others negative. Physical Exam:  Temp:  [97.4  F (36.3  C)-101.7  F (38.7  C)] 98.5  F (36.9  C)  Heart Rate:  [] 84  Resp:  [16-29] 22  BP:  "()/(59-87) 104/70    /70 (Patient Position: Lying)  Pulse 84  Temp 98.5  F (36.9  C) (Oral)   Resp 22  Ht 5' 6\" (1.676 m)  Wt 133 lb (60.3 kg)  LMP 08/02/2017  SpO2 98%  BMI 21.47 kg/m2  General appearance: alert, appears stated age and cooperative  Head: Normocephalic, without obvious abnormality, atraumatic  Eyes: Extraocular muscles intact, no icterus.  Throat: lips, mucosa, and tongue normal; teeth and gums normal  Lungs: clear to auscultation bilaterally  Heart: regular rate and rhythm, S1, S2 normal, no murmur, click, rub or gallop  Abdomen: soft, non-tender; bowel sounds normal; no masses,  no organomegaly  Extremities: Right ankles wrapped in a dressing.  Left shoulder also has a dressing  Skin: Skin color, texture, turgor normal. No rashes or lesions  Neurologic: Grossly normal       Pertinent Labs  Lab Results: personally reviewed.     Results from last 7 days  Lab Units 08/17/17  1217 08/17/17  0552 08/16/17  0503   LN-WHITE BLOOD CELL COUNT thou/uL 8.9 9.0 16.5*   LN-HEMOGLOBIN g/dL 7.3* 7.5* 9.4*   LN-HEMATOCRIT % 21.8* 21.9* 26.6*   LN-PLATELET COUNT thou/uL 103* 105* 157        Results from last 7 days  Lab Units 08/17/17  0552 08/16/17  0503   LN-SODIUM mmol/L 129* 120*   LN-POTASSIUM mmol/L 3.9 3.3*   LN-CHLORIDE mmol/L 103 93*   LN-CO2 mmol/L 25 24   LN-BLOOD UREA NITROGEN mg/dL 14 15   LN-CREATININE mg/dL 0.76 0.86   LN-CALCIUM mg/dL 9.6 11.1*     Microbiology Results (last 7 days)        Procedure Component Value Units Date/Time       Culture and Gram Stain, Drainage [[id number]] (Abnormal) Collected: 08/16/17 0946       Order Status: Completed Specimen: Body Fluid from Joint, Other Updated: 08/17/17 1106        Culture 4+ Alpha Strep to be Identified (!)        Gram Stain Result 4+ Polymorphonuclear leukocytes         4+ Gram positive cocci in pairs         2+ Gram positive cocci in chains       Culture/Gram Stain: Body Fluid [[id number]] Collected: 08/16/17 3949       Order " Status: Completed Specimen: Body Fluid from Other Updated: 1748        Gram Stain Result No polymorphonuclear leukocytes seen         No organisms seen         Results reviewed and amended by microbiology staff          See corrected result below         4+ Polymorphonuclear leukocytes         No organisms seen       Culture, Blood Positive Work-up [[id number]] Collected: 17 0653       Order Status: Completed Specimen: Blood Updated: 17 2053        Gram Stain Result Gram positive cocci       Culture, Blood Positive Work-up [[id number]] Collected: 17 05       Order Status: Completed Specimen: Blood Updated: 17 194        Gram Stain Result Gram positive cocci       Culture/Gram Stain: Body Fluid [[id number]]        Order Status: Sent Specimen: Body Fluid from Shoulder, Left        Chlamydia trachomatis & Neisseria gonorrhoeae, Amplified Detection [[id number]] Collected: 1739       Order Status: Sent Specimen: Body Fluid Updated: 17      Echo Transesophageal   Order# [id number]    Reading physician: Cherie Lopes MD   Ordering physician: Paulino Starks MD   Study date: 17         Patient Information      Patient Name MRN Sex              Age     Lauryn Raya [id number] Female 1970 (46 y.o.)       Indications      Endocarditis       Summary      1. Normal left ventricular size and systolic performance with a visually estimated ejection fraction of 55%.   2. No significant valvular heart disease is identified on this study.   3. Normal right ventricular size and systolic performance.   4. No valvular vegetation is identified in this study.  5. No intracardiac mass or thrombus is detected.  6. Echo contrast examination was performed using agitated NS as contrast agent. The right heart opacity was adequate and the left heart was adequately visualized. There was no evidence of right to left shunt during spontaneous respiration or following release of  Valsalva.      The study was performed using a multiplane adult transducer. 2-D, colorflow Doppler, and spectral Doppler analysis was performed. Informed consent was obtained prior to the procedure.  Sedation: fentanyl 50 mcg & versed 1.0 mg iv. Topical anesthesia was performed with viscous lidocaine and benzocaine spray. The transducer was introduced without difficulty. There were no complications of the procedure.           Pertinent Radiology  Radiology Results: Personally reviewed impression/s     Ct Head Without Contrast    Result Date: 8/16/2017  [facility] CT HEAD WO CONTRAST 8/16/2017 6:28 AM INDICATION: Confusion with polyarthropathy TECHNIQUE: Routine. Dose reduction techniques were used. CONTRAST: None. COMPARISON:  None FINDINGS: No intracranial hemorrhage, extraaxial collection, mass effect or CT evidence of acute infarct.  Normal parenchymal density for age. The ventricles and sulci are normal for age. Osseous structures are intact. The visualized orbits, paranasal sinuses and mastoid air cells are free of significant disease.     CONCLUSION: 1.  Normal head CT. 2.  No CT finding of a mass, infarct or hemorrhage.     Mr Thoracic Spine With Without Contrast    Result Date: 8/16/2017  [facility] MR THORACIC SPINE W WO CONTRAST, MR LUMBAR SPINE W WO CONTRAST 8/16/2017 11:39 AM INDICATION: Urinary retention, leg pain, leukocytosis and elevated inflammatory markers. TECHNIQUE: THORACIC SPINE MRI: Performed without and with IV contrast. LUMBAR SPINE MRI: Performed without and with IV contrast. CONTRAST: 7 mL IV Gadavist. SEDATION: None. COMPARISON: None. FINDINGS: THORACIC SPINE MRI: Normal sagittal alignment of the thoracic spine. Vertebral body heights are maintained. Mildly heterogeneous marrow signal. There is a small lesion involving the posterior T10 vertebral body, which demonstrates T2 prolongation and enhancement. There might be subtle associated T1 shortening at T10. In addition,  there is a similar-appearing lesion in the mid L1 vertebral body with similar characteristics. Small atypical hemangioma at T12. Intervertebral disc spaces are maintained. No high-grade spinal canal narrowing. No high-grade right-sided neural foraminal narrowing. No high-grade left-sided neural foraminal narrowing. No abnormal cord signal. No abnormal signal within the intervertebral discs. The paraspinal soft tissues are grossly within normal limits. Small left pleural effusion. There are bibasilar atelectasis. Normal diameter of the descending aorta. LUMBAR SPINE MRI: Nomenclature is based on 5 lumbar type vertebral bodies. The conus ends at L1. There is mild levocurvature of the lumbar spine. Sagittal alignment is within normal limits. Vertebral body heights are maintained. Heterogeneous marrow signal. There is an ovoid focus of T2 prolongation and enhancement involving the L1 vertebral body. No other areas of abnormal enhancement. No MRI evidence for pars defects. The visualized bony pelvis and proximal femora are grossly within normal limits.  The paraspinal soft tissues are grossly within normal limits. Normal diameter of the descending aorta. T12-L1: Normal disc height and signal. No herniation. No facet arthropathy. No spinal canal stenosis. No right neural foraminal stenosis. No left neural foraminal stenosis. L1-L2: Normal disc height. Loss of the normal T2 signal within the disc. No herniation. Mild bilateral facet arthropathy. No spinal canal stenosis. No right neural foraminal stenosis. No left neural foraminal stenosis. L2-L3: Normal disc height and signal. Shallow broad-based disc bulge. Mild bilateral facet arthropathy. No spinal canal stenosis. No right neural foraminal stenosis. No left neural foraminal stenosis. L3-L4: Normal intervertebral disc height and signal. There is a shallow broad-based disc bulge with superimposed small central/right paracentral disc protrusion. Mild bilateral facet  arthropathy. No spinal canal narrowing. No neural foraminal narrowing. L4-L5: Normal disc height and signal. Shallow broad-based disc bulge. Mild bilateral facet arthropathy. No spinal canal stenosis. No right neural foraminal stenosis. No left neural foraminal stenosis. L5-S1: Normal disc height and signal. No herniation. Mild bilateral facet arthropathy. No spinal canal stenosis. No right neural foraminal stenosis. No left neural foraminal stenosis.     CONCLUSION: THORACIC SPINE MRI: 1.  No MRI evidence for discitis/osteomyelitis. 2.  Mild degenerative changes of the thoracic spine. No high-grade spinal canal or neural foraminal narrowing. 3.  The small focus of T2 prolongation and enhancement at the T10 vertebral body has mild associated T1 shortening and is an atypical hemangioma. LUMBAR SPINE MRI: 1.  No MRI evidence for discitis/osteomyelitis. 2.  Mild degenerative changes of the lumbar spine. No high-grade spinal canal or neural foraminal narrowing. 3.  There is a small focus of T2 prolongation and enhancement at the L1 vertebral body without definite associated T1 shortening. This is indeterminate. It could still represent an atypical hemangioma. Recommend obtaining thin cut CT images at this level  for further evaluation.     Mr Lumbar Spine With Without Contrast    Result Date: 8/16/2017  [facility] MR THORACIC SPINE W WO CONTRAST, MR LUMBAR SPINE W WO CONTRAST 8/16/2017 11:39 AM INDICATION: Urinary retention, leg pain, leukocytosis and elevated inflammatory markers. TECHNIQUE: THORACIC SPINE MRI: Performed without and with IV contrast. LUMBAR SPINE MRI: Performed without and with IV contrast. CONTRAST: 7 mL IV Gadavist. SEDATION: None. COMPARISON: None. FINDINGS: THORACIC SPINE MRI: Normal sagittal alignment of the thoracic spine. Vertebral body heights are maintained. Mildly heterogeneous marrow signal. There is a small lesion involving the posterior T10 vertebral body, which demonstrates T2  prolongation and enhancement. There might be subtle associated T1 shortening at T10. In addition, there is a similar-appearing lesion in the mid L1 vertebral body with similar characteristics. Small atypical hemangioma at T12. Intervertebral disc spaces are maintained. No high-grade spinal canal narrowing. No high-grade right-sided neural foraminal narrowing. No high-grade left-sided neural foraminal narrowing. No abnormal cord signal. No abnormal signal within the intervertebral discs. The paraspinal soft tissues are grossly within normal limits. Small left pleural effusion. There are bibasilar atelectasis. Normal diameter of the descending aorta. LUMBAR SPINE MRI: Nomenclature is based on 5 lumbar type vertebral bodies. The conus ends at L1. There is mild levocurvature of the lumbar spine. Sagittal alignment is within normal limits. Vertebral body heights are maintained. Heterogeneous marrow signal. There is an ovoid focus of T2 prolongation and enhancement involving the L1 vertebral body. No other areas of abnormal enhancement. No MRI evidence for pars defects. The visualized bony pelvis and proximal femora are grossly within normal limits.  The paraspinal soft tissues are grossly within normal limits. Normal diameter of the descending aorta. T12-L1: Normal disc height and signal. No herniation. No facet arthropathy. No spinal canal stenosis. No right neural foraminal stenosis. No left neural foraminal stenosis. L1-L2: Normal disc height. Loss of the normal T2 signal within the disc. No herniation. Mild bilateral facet arthropathy. No spinal canal stenosis. No right neural foraminal stenosis. No left neural foraminal stenosis. L2-L3: Normal disc height and signal. Shallow broad-based disc bulge. Mild bilateral facet arthropathy. No spinal canal stenosis. No right neural foraminal stenosis. No left neural foraminal stenosis. L3-L4: Normal intervertebral disc height and signal. There is a shallow broad-based disc  bulge with superimposed small central/right paracentral disc protrusion. Mild bilateral facet arthropathy. No spinal canal narrowing. No neural foraminal narrowing. L4-L5: Normal disc height and signal. Shallow broad-based disc bulge. Mild bilateral facet arthropathy. No spinal canal stenosis. No right neural foraminal stenosis. No left neural foraminal stenosis. L5-S1: Normal disc height and signal. No herniation. Mild bilateral facet arthropathy. No spinal canal stenosis. No right neural foraminal stenosis. No left neural foraminal stenosis.     CONCLUSION: THORACIC SPINE MRI: 1.  No MRI evidence for discitis/osteomyelitis. 2.  Mild degenerative changes of the thoracic spine. No high-grade spinal canal or neural foraminal narrowing. 3.  The small focus of T2 prolongation and enhancement at the T10 vertebral body has mild associated T1 shortening and is an atypical hemangioma. LUMBAR SPINE MRI: 1.  No MRI evidence for discitis/osteomyelitis. 2.  Mild degenerative changes of the lumbar spine. No high-grade spinal canal or neural foraminal narrowing. 3.  There is a small focus of T2 prolongation and enhancement at the L1 vertebral body without definite associated T1 shortening. This is indeterminate. It could still represent an atypical hemangioma. Recommend obtaining thin cut CT images at this level  for further evaluation.     Xr Aspiration Or Injection Major Joint    Result Date: 8/16/2017  1. LEFT SHOULDER JOINT ASPIRATION FOR FLUID and CULTURES 2. FLUOROSCOPIC GUIDANCE 8/16/2017 2:11 PM INDICATION: Pain possible septic joint, evidence for septic right ankle joint. PROCEDURE: Procedure and risks explained and consent received. The skin was prepped and draped in sterile fashion. 10 mL 1% lidocaine infused in local soft tissues. Under direct fluoroscopic guidance, a 22 gauge spinal needle was introduced into the joint space. Approximately 1 mL of cloudy fluid was obtained and then no further fluid could be  obtained. A small amount of contrast was then injected to confirm that the needle was intra-articular and approximately 4 mL of saline were injected in the joint and aspirated back. COMPLICATIONS: None. SPECIMEN: The tube labeled #1 has approximately 1 mL of cloudy joint fluid. The tube labeled #2 has 3 to 4 mL of saline that was injected in into joint space and aspirated back. RADIOLOGIC SUPERVISION AND INTERPRETATION: Fluoroscopy demonstrates intraarticular needle tip position. FLUOROSCOPIC TIME: 1.5 minutes. NUMBER OF IMAGES: 3.     CONCLUSION: Fluoroscopic guided aspiration of left shoulder for cultures. Approximately 1 cc of cloudy fluid was initially obtained. No further fluid could obtained at that point and 4 cc of nonbacteriostatic saline were injected into the joint space and aspirated back.    Ct Ankle With Contrast Right    Result Date: 8/16/2017  [facility] CT OF THE RIGHT ANKLE with contrast 8/16/2017 6:29 AM INDICATION: Swelling, warmth and tenderness TECHNIQUE: Postcontrast after administration of 100 mL of Omnipaque 350 IV. Axial, sagittal and coronal thin-section reconstruction. Dose reduction techniques were used. COMPARISON: None. FINDINGS: There is soft tissue thickening and reticulation about the ankle, with involvement of the subcutaneous tissues. There is no deep compartment involvement. No drainable fluid collection. There is a tibiotalar joint effusion. No rim-enhancing collections are present. There is no fracture or dislocation. Joint spaces are maintained.     CONCLUSION: 1.  Diffuse soft tissue thickening and edema about the ankle is likely related to inflammation or cellulitis. 2.  Tibiotalar joint effusion. No fracture.     Us Aspiration Or Injection Intermediate    Result Date: 8/16/2017  1. RIGHT ANKLE JOINT ASPIRATION 2. ULTRASOUND GUIDANCE 8/16/2017 9:54 AM INDICATION: Septic joint. PROCEDURE: Procedure and risks explained and consent received. The skin was prepped and  draped in sterile fashion. 10 mL 1% lidocaine infused in local soft tissues. Under direct imaging guidance, a 18-gauge needle was introduced into the joint space. COMPLICATIONS: None. SPECIMEN: 7 mL of thick purulent-appearing fluid was aspirated and sent to lab as ordered by patient's provider. RADIOLOGIC SUPERVISION AND INTERPRETATION: Imaging demonstrates intraarticular needle tip position.     CONCLUSION: Successful image-guided right ankle joint aspiration with 7 mL of purulent appearing fluid aspirated. Findings discussed with patient's ER provider, Dr. Chantel Martinez, on 8/16/2017 at 0945 hours.

## 2021-07-20 NOTE — ED PROVIDER NOTES
eMERGENCY dEPARTMENT eNCOUnter        CHIEF COMPLAINT    Chief Complaint   Patient presents with     Shoulder Pain       HPI    Lauryn Raya is a 46 y.o. female who presents for evaluation of bilateral shoulder pain, right hip pain and right foot pain.  Patient is extremely poor historian.  She relates having onset of some discomfort in her shoulders and hips recently.  She reports being seen in the clinic and started on medications.  She is not certain for what.  However since being seen at the clinic the pain appears to be intensifying especially in the right hip and foot.  When she awakened this morning her right foot and ankle are extremely swollen and terribly painful such that she not walk.  She denies any obvious injuries.  REVIEW OF SYSTEMS    Negative fevers, chills positive shoulder pain, right hip pain, right ankle pain.  Denies headaches, cough, shortness of breath.  Remainder review of systems otherwise negative or unavailable secondary to patient's confusion.    PAST MEDICAL HISTORY    No past medical history on file.    SURGICAL HISTORY    No past surgical history on file.    CURRENT MEDICATIONS    [unfilled]    ALLERGIES    No Known Allergies    FAMILY HISTORY    No family history on file.    SOCIAL HISTORY    Social History     Social History     Marital status:      Spouse name: N/A     Number of children: N/A     Years of education: N/A     Social History Main Topics     Smoking status: Not on file     Smokeless tobacco: Not on file     Alcohol use Not on file     Drug use: Not on file     Sexual activity: Not on file     Other Topics Concern     Not on file     Social History Narrative       PHYSICAL EXAM    VITAL SIGNS: /86  Pulse (!) 125  Temp 100.2  F (37.9  C) (Oral)   Resp 24  Wt 130 lb (59 kg)  LMP 08/02/2017  SpO2 99%  Constitutional:  Well developed, well nourished, female in moderate distress and mildly confused   HENT:  Atraumatic, external ears normal, nose  normal, oropharynx moist.  Neck- normal range of motion, no tenderness, supple without meningismus.  Pupils equal round reactive to light.  Extraocular movements conjugate.  Face symmetrical and active.  Respiratory:  No respiratory distress, normal breath sounds.  No chest wall tenderness  Cardiovascular:  Normal rate, normal rhythm, no murmurs, no gallops, no rubs   GI:  Soft, nondistended, normal bowel sounds, nontender   Musculoskeletal: Shoulders without warmth, erythema, crepitus.  Resists range of motion of both shoulder secondary discomfort.  Left lower extremity with full range of motion with minimal discomfort.  Patient strongly resists any flexion of her right hip secondary to pain.  She also resists internal/external rotation.  Right foot diffusely swollen from the ankle to the midfoot.  With erythema and exquisite tenderness.  She resists all movement secondary to pain.    Integument:  Well hydrated, no rash   Neurologic: Patient alert to person and place.  Confused somewhat to time.  Her history is very tangential and circuitous.  She has difficulty answering direct questions.  No focal weakness elicited.      RADIOLOGY/PROCEDURES    Please see official radiology report.  Ct Head Without Contrast    Result Date: 8/16/2017  [facility] CT HEAD WO CONTRAST 8/16/2017 6:28 AM INDICATION: Confusion with polyarthropathy TECHNIQUE: Routine. Dose reduction techniques were used. CONTRAST: None. COMPARISON:  None FINDINGS: No intracranial hemorrhage, extraaxial collection, mass effect or CT evidence of acute infarct.  Normal parenchymal density for age. The ventricles and sulci are normal for age. Osseous structures are intact. The visualized orbits, paranasal sinuses and mastoid air cells are free of significant disease.     CONCLUSION: 1.  Normal head CT. 2.  No CT finding of a mass, infarct or hemorrhage.     Results for orders placed or performed during the hospital encounter of 08/16/17   Basic  Metabolic Panel   Result Value Ref Range    Sodium 120 (L) 136 - 145 mmol/L    Potassium 3.3 (L) 3.5 - 5.0 mmol/L    Chloride 93 (L) 98 - 107 mmol/L    CO2 24 22 - 31 mmol/L    Anion Gap, Calculation 3 (L) 5 - 18 mmol/L    Glucose 136 (H) 70 - 125 mg/dL    Calcium 11.1 (H) 8.5 - 10.5 mg/dL    BUN 15 8 - 22 mg/dL    Creatinine 0.86 0.60 - 1.10 mg/dL    GFR MDRD Af Amer >60 >60 mL/min/1.73m2    GFR MDRD Non Af Amer >60 >60 mL/min/1.73m2   Hepatic Profile   Result Value Ref Range    Bilirubin, Total 1.0 0.0 - 1.0 mg/dL    Bilirubin, Direct 0.4 <=0.5 mg/dL    Protein, Total 12.3 (H) 6.0 - 8.0 g/dL    Albumin 1.8 (L) 3.5 - 5.0 g/dL    Alkaline Phosphatase 47 45 - 120 U/L    AST 45 (H) 0 - 40 U/L    ALT 27 0 - 45 U/L   Lactic Acid   Result Value Ref Range    Lactic Acid 1.2 0.5 - 2.2 mmol/L   CK   Result Value Ref Range    CK, Total 359 (H) 30 - 190 U/L   C-Reactive Protein   Result Value Ref Range    CRP 20.9 (H) 0.0 - 0.8 mg/dL   HM2 (CBC W/O DIFF)   Result Value Ref Range    WBC 16.5 (H) 4.0 - 11.0 thou/uL    RBC 2.88 (L) 3.80 - 5.40 mill/uL    Hemoglobin 9.4 (L) 12.0 - 16.0 g/dL    Hematocrit 26.6 (L) 35.0 - 47.0 %    MCV 92 80 - 100 fL    MCH 32.6 27.0 - 34.0 pg    MCHC 35.3 32.0 - 36.0 g/dL    RDW 13.1 11.0 - 14.5 %    Platelets 157 140 - 440 thou/uL    MPV 9.0 8.5 - 12.5 fL     ED COURSE & MEDICAL DECISION MAKING    Pertinent Labs & Imaging studies reviewed. (See chart for details)  Patient is a typically healthy 46-year-old female with reports of severe bilateral shoulder pain, right hip pain and especially right foot and ankle pain.  She denies any obvious injuries.  Initially she attributed the discomfort to exertions with recent move.  Records indicate she was seen a few days ago and started on gabapentin and tramadol for possible right leg radiculopathy.  However tonight she has a markedly swollen and erythematous ankle suggestive of possible septic arthritis.  She also strongly resists any movement in her  right hip suggestive of potential arthropathy or infection.  Her shoulders are without warmth but she certainly  just movement secondary to pain.  Patient is also seemingly confused she has a great deal of difficulty relating her history and seems markedly tangential.  Concerns were of septic emboli.  Possibility of this being related to recent initiation of tramadol and gabapentin also.  Laboratory evaluation reveals leukocytosis, elevated sed rate and CRP suggestive of infection/inflammatory process.  She is also markedly anemic with a hemoglobin of 9.4 with normal cell indices.  She reports no stool changes suggestive for blood loss.  CT of the ankle has been ordered.  However, she may require CT scanning of the hip also.    Patient 's care transferred my associate a change of shift.  Awaiting CT results.  Patient will require hospitalization and likely arthrocentesis.   FINAL IMPRESSION    1.  Non-specific polyarthropathy  2.  Confusion     Cruzito Manriquez MD  08/16/17 8094

## 2021-07-20 NOTE — PROGRESS NOTES
MD reports pt will not d/c today, 1 RBC unit transfusion scheduled for this morning.     Per CMA, [facility] is still reviewing referral. [facility] declined due to bed status.  RUTHIE updated pt and pt's parents at bedside. Discussed d/c tentative for tomorrow pending medical clearance.  Pt's parents spoke with SW outside of pt's room. They had questions regarding pt's decision making and who makes decisions for pt. SW reviewed pt's chart, no health care directive filed. Advised that if pt is unable to make decisions than providers refer to family. They report pt's SO is Olegario, they are not . Advised that providers will use family and friends who have pt's best interest to make decisions. RUTHIE provided pt and parents with a copy of a blank health care directive. RUTHIE also asked pt about her emergency contacts as parents are not listed. Pt agrees to add parents which SW updated on pt's chart. Parents also asking for update from MD. RUTHIE paged MD who came up and spoke with pt's parents.     10:16 AM       Updated by [facility] that [facility] accepted pt for tomorrow. RUTHIE updated pt in room. Pt agreeable to [facility]. SW updated her also about the Health Care Directive. Searched in room and appears that pt's parents have it. Pt will talk to them about it. Discussed transport. Pt hopes her parents can transport her. Pt agrees to RUTHIE calling parents with TCU bed update.     Msg left for pt's parents Stalin and Christy on their cell phone updating them regarding the facility acceptance.      RUTHIE Raymundo LGSW 8/19/2017 2:26 PM

## 2021-07-20 NOTE — PROGRESS NOTES
Progress Note    Assessment/Plan  46 years old female  with past medical history of depression and chronic back pain who presents  to the emergency room for evaluation of bilateral shoulder pain, right hip pain and right foot pain.  She was admitted with     Multiple septic joints  - Etiology is unclear, patient denies IV drug abuse  - Orthopedic consult is appreciated  - S/P incision and drainage of right ankle and left shoulder  - Empiric antibiotic with vancomycin and Zosyn  - Check chlamydia and gonorrhea and urine drug screen  - Positive blood culture  - ID consul   - Check transesophageal echo today    Sepsis   - Source is probably septic joints  - Positive blood culture  - Continue IV fluid and antibiotics  - ID consult     Hyponatremia  - Secondary to hypovolemia  - Improving with IV fluid  - Continue to monitor sodium level  - CT head is unremarkable for acute changes     Rhabdomyolysis  - Etiology is unclear  - Improving  - Continue IV fluids hydration  - Monitor CK level     Acute encephalopathy  - Metabolic, secondary to the above  - Patient is still lethargic today but better than yesterday, she is on scheduled oxycodone every 4 hour  - Discontinue scheduled narcotics and use only as needed  - Continue supportive care  - Delirium order set  - CT head is unremarkable for acute changes    Anemia  - Acute on chronic  - Part is hemodilution secondary to aggressive IV hydration  - Check iron studies, vitamin B12, folic acid, and Hemoccult blood     Chronic back pain  - MRI is negative for osteomyelitis/discitis  - Pain control with as needed oxycodone  - Discontinue scheduled oxycodone as patient is very lethargic  - PT evaluation once medically     Hypokalemia  - Replace per protocol    Hypomagnesemia  - Replace per protocol     Depression  - Resume home medications     Code status :  Full code     Discussed with family , patient  , nursing staff and discharge planner     Disposition Plan: Probably  TCU  Barriers to Discharge: Sepsis, IV antibiotics    Principal Problem:    Septic arthritis  Active Problems:    Polyarthropathy      Subjective  Lauryn is feeling better today, lethargic but responding to verbal stimuli better than yesterday, denies any pain or shortness of breath, spiked fever last night, NPO for INDY today    Objective    Vital signs in last 24 hours  Temp:  [97.4  F (36.3  C)-101.7  F (38.7  C)] 97.5  F (36.4  C)  Heart Rate:  [] 79  Resp:  [16-29] 16  BP: (101-140)/(63-85) 101/63    Wt Readings from Last 3 Encounters:   08/16/17 1845 133 lb (60.3 kg)   08/16/17 0450 130 lb (59 kg)       Intake/Output last 3 shifts  I/O last 3 completed shifts:  In: 4520 [P.O.:480; I.V.:3670; IV Piggyback:370]  Out: 1610 [Urine:1600; Blood:10]  Intake/Output this shift:       Review of Systems   A 12 point comprehensive review of systems was negative except as noted.    Physical Exam  General: Well developed , sitting comfortable on bed , No apparent distress  Head: Normocephalic, atraumatic  Eyes: Pupils equal, round and reactive to light   Mouth: Mucus membranes moist  Neck: Supple, No JVD  Cardiovascular: Regular rate and rhythm, Normal S1, S2  Lungs: Clear to auscultation bilaterally, no wheeze.  Abdomen: Soft, Non-tender, not distended, Bowel sounds present  Extremities: No edema, no clubbing, dressing applied to right ankle and left shoulder  Skin: Warm and well-perfused without lesions.  Neurologic: lethargic,  face is symmetric, Moves all extremities equally  Psychiatry : appropriate with examiner       Pertinent Labs   Lab Results: personally reviewed.   Lab Results   Component Value Date     (L) 08/17/2017     (L) 08/16/2017    K 3.9 08/17/2017    K 3.3 (L) 08/16/2017    CO2 25 08/17/2017    CO2 24 08/16/2017    BUN 14 08/17/2017    BUN 15 08/16/2017    CREATININE 0.76 08/17/2017    CREATININE 0.86 08/16/2017    CALCIUM 9.6 08/17/2017    CALCIUM 11.1 (H) 08/16/2017     Lab Results    Component Value Date    WBC 9.0 08/17/2017    WBC 16.5 (H) 08/16/2017    HGB 7.5 (L) 08/17/2017    HGB 9.4 (L) 08/16/2017    HCT 21.9 (L) 08/17/2017    HCT 26.6 (L) 08/16/2017    MCV 95 08/17/2017    MCV 92 08/16/2017     (L) 08/17/2017     08/16/2017           Advanced Care Planning  Discharge Planning discussed with patient  Total time 35 minutes with >50% spent in counseling regarding plan and coordinating care.      Paulino Starks MD  Hospitalist  Pager [phone]

## 2021-07-20 NOTE — PROGRESS NOTES
[facility]      Code Status:  FULL CODE  Visit Type: Review Of Multiple Medical Conditions     Facility:  [facility] [[id number]]           History of Present Illness: Lauryn Raya is a 46 y.o. female admitted to the TCU as a transfer from the hospital and was examined in the presence of both her parents.  This is a previously healthy 46-year-old with underlying history of depression or chronic back pain who presented to the hospital for evaluation of bilateral shoulder pain with hip and foot pain she was also noted to be very confused at the time of her presentation.  Had presented to her primary care physician for similar complaints about 2 weeks ago prior to this hospitalization and was felt to have musculoskeletal aches and pains and given symptomatic treatment.  Orthopedics saw this patient, a CT of the right ankle showed no fractures or dislocation but soft tissue swelling and tibiotalar joint effusion and it was felt with the setting of multiple septic joints and a toxic appearance and abnormal labs she should have surgery so she underwent open irrigation and debridement of her right ankle with arthroscopic irrigation and debridement of her left shoulder done on 8/16/17   her cultures grew alpha hemolytic strep bacteremia with septic arthritis right ankle. a transesophageal echocardiogram was negative for any vegetations and she is currently on 2 weeks of IV ceftriaxone and subsequently she will switch to oral antibiotics with outpatient follow-up with infectious disease  She is doing much better but she has still some difficulty with weightbearing and her pain is improving too    Past Medical History:   Diagnosis Date     Anxiety      Depression      Septic arthritis      Past Surgical History:   Procedure Laterality Date     PICC  8/18/2017          No family history on file.  Social History     Social History     Marital status:      Spouse name: N/A      Number of children: N/A     Years of education: N/A     Occupational History     Not on file.     Social History Main Topics     Smoking status: Never Smoker     Smokeless tobacco: Never Used     Alcohol use Yes      Comment: occasional     Drug use: No     Sexual activity: Not on file     Other Topics Concern     Not on file     Social History Narrative     Current Outpatient Prescriptions   Medication Sig Dispense Refill     acetaminophen (TYLENOL) 325 MG tablet Take 2 tablets (650 mg total) by mouth every 6 (six) hours as needed. 100 tablet 0     aspirin 325 MG tablet Take 1 tablet (325 mg total) by mouth daily. 30 tablet 0     busPIRone (BUSPAR) 10 MG tablet Take 20 mg by mouth 2 (two) times a day.       [START ON 9/1/2017] cefdinir (OMNICEF) 300 MG capsule Take 1 capsule (300 mg total) by mouth 2 (two) times a day for 14 days. Start after finish IV antibiotics 28 capsule 0     cefTRIAXone 2 g in NaCl 0.9 % 0.9 % 50 mL IVPB Infuse 2 g into a venous catheter daily for 14 days. 1 each 0     ferrous sulfate (FERROUSUL) 325 (65 FE) MG tablet Take 1 tablet (325 mg total) by mouth daily with breakfast.  0     FLUoxetine (PROZAC) 40 MG capsule Take 40 mg by mouth daily.       gabapentin (NEURONTIN) 100 MG capsule Take 100-200 mg by mouth at bedtime as needed (for nerve pain).       ibuprofen (ADVIL,MOTRIN) 200 MG tablet Take 400 mg by mouth every 6 (six) hours as needed for pain.       magnesium oxide 250 mg Tab Take by mouth daily.       melatonin 3 mg Tab tablet Take 3 mg by mouth at bedtime.       omeprazole (PRILOSEC) 20 MG capsule Take 1 capsule (20 mg total) by mouth daily before breakfast.  0     oxyCODONE (ROXICODONE) 5 MG immediate release tablet Take 1-2 tablets (5-10 mg total) by mouth every 4 (four) hours as needed. 60 tablet 0     rizatriptan (MAXALT) 10 MG tablet Take 10 mg by mouth as needed for migraine (may repeat x1 in 2 hours, maximum of 3 tablets/24 hours). May repeat in 2 hours if needed        senna-docusate (PERICOLACE) 8.6-50 mg tablet Take 1 tablet by mouth 2 (two) times a day. 60 tablet 0     No current facility-administered medications for this visit.      No Known Allergies      Review of Systems:    Constitutional: Negative.  Negative for fever, chills, has activity change, appetite change and fatigue.   HENT: Negative for congestion and facial swelling.    Eyes: Negative for photophobia, redness and visual disturbance.   Respiratory: Negative for cough and chest tightness.    Cardiovascular: Negative for chest pain, palpitations and leg swelling.   Gastrointestinal: Negative for nausea, diarrhea, constipation, blood in stool and abdominal distention.   Genitourinary: Negative.    Musculoskeletal: Negative.  Has some pain in her right ankle and left shoulder  Skin: Negative.    Neurological: Negative for dizziness, tremors, syncope, weakness, light-headedness and headaches.   Hematological: Does not bruise/bleed easily.   Psychiatric/Behavioral: Negative.    Her mood is stable    Vitals:    08/28/17 1105   BP: 132/81   Pulse: 75   Temp: 97  F (36.1  C)       Physical Exam:    GENERAL: no acute distress. Cooperative in conversation.   HEENT: pupils are equal, round and reactive. Oral mucosa is moist and intact.  RESP:Chest symmetric. Regular respiratory rate. No stridor.  CVS: S1S2  ABD: Nondistended, soft.  EXTREMITIES: No lower extremity edema.  Right ankle incision is intact it is painful and tender but no evidence of any a time or drainage from her incision site  Left shoulder incision was healing well also  NEURO: non focal. Alert and oriented x3.   PSYCH: within normal limits. No depression or anxiety.  SKIN: warm dry intact     Labs:    Lab Results   Component Value Date    WBC 19.9 (H) 08/25/2017    HGB 9.0 (L) 08/25/2017    HCT 27.7 (L) 08/25/2017     08/25/2017     08/25/2017     Recent Results (from the past 168 hour(s))   Creatinine   Result Value Ref Range    Creatinine  0.81 0.60 - 1.10 mg/dL    GFR MDRD Af Amer >60 >60 mL/min/1.73m2    GFR MDRD Non Af Amer >60 >60 mL/min/1.73m2   AST (SGOT)   Result Value Ref Range    AST 37 0 - 40 U/L   HM1 (CBC with Diff)   Result Value Ref Range    WBC 19.9 (H) 4.0 - 11.0 thou/uL    RBC 2.77 (L) 3.80 - 5.40 mill/uL    Hemoglobin 9.0 (L) 12.0 - 16.0 g/dL    Hematocrit 27.7 (L) 35.0 - 47.0 %     80 - 100 fL    MCH 32.5 27.0 - 34.0 pg    MCHC 32.5 32.0 - 36.0 g/dL    RDW 13.7 11.0 - 14.5 %    Platelets 226 140 - 440 thou/uL    MPV 9.7 8.5 - 12.5 fL   Manual Differential   Result Value Ref Range    Total Neutrophils % 61 50 - 70 %    Lymphocytes % 20 20 - 40 %    Monocytes % 5 2 - 10 %    Eosinophils %  2 0 - 6 %    Basophils % 0 0 - 2 %    Metamyelocytes % 7 (H) <=1 %    Myelocytes % 5 (H) <=1 %    Other Cells % 2 (H) <=0 %    Total Neutrophils Absolute 12.0 (H) 2.0 - 7.7 thou/ul    Lymphocytes Absolute 4.0 0.8 - 4.4 thou/uL    Monocytes Absolute 0.9 0.0 - 0.9 thou/uL    Eosinophils Absolute 0.4 0.0 - 0.4 thou/uL    Basophils Absolute 0.0 0.0 - 0.2 thou/uL    Metamyelocytes Absolute 1.4 (H) <=0.1 thou/uL    Myelocytes Absolute 0.9 (H) <=0.1 thou/uL    Other Cells Absolute 0.3 0.0-<1.0 thou/uL    Manual nRBC per 100 Cells 1 (H) 0-<1    Reactive Lymphocytes 1+ (!) Negative    Platelet Estimate Normal Normal    Polychromasia 1+ (!) Negative   C-Reactive Protein (CRP)   Result Value Ref Range    CRP 1.5 (H) 0.0 - 0.8 mg/dL   HM1 (CBC with Diff)   Result Value Ref Range    WBC 15.5 (H) 4.0 - 11.0 thou/uL    RBC 2.38 (L) 3.80 - 5.40 mill/uL    Hemoglobin 7.7 (L) 12.0 - 16.0 g/dL    Hematocrit 23.8 (L) 35.0 - 47.0 %     80 - 100 fL    MCH 32.4 27.0 - 34.0 pg    MCHC 32.4 32.0 - 36.0 g/dL    RDW 14.0 11.0 - 14.5 %    Platelets 272 140 - 440 thou/uL    MPV 8.9 8.5 - 12.5 fL   Manual Differential   Result Value Ref Range    Total Neutrophils % 66 50 - 70 %    Lymphocytes % 24 20 - 40 %    Monocytes % 6 2 - 10 %    Eosinophils %  0 0 - 6 %     Basophils % 1 0 - 2 %    Metamyelocytes % 2 (H) <=1 %    Myelocytes % 1 <=1 %    Total Neutrophils Absolute 10.2 (H) 2.0 - 7.7 thou/ul    Lymphocytes Absolute 3.7 0.8 - 4.4 thou/uL    Monocytes Absolute 0.9 0.0 - 0.9 thou/uL    Eosinophils Absolute 0.0 0.0 - 0.4 thou/uL    Basophils Absolute 0.2 0.0 - 0.2 thou/uL    Metamyelocytes Absolute 0.3 (H) <=0.1 thou/uL    Myelocytes Absolute 0.2 (H) <=0.1 thou/uL    Platelet Estimate Normal Normal    Polychromasia 1+ (!) Negative            Assessment/Plan:    Arthritis of her right ankle status post incision and drainage of the right ankle as well as left shoulder and currently discharged on IV ceftriaxone for total of 14 days.   LeukocytosesWith a white count of 19.9.  This is a significant jump from her previous white count of 9 in the hospital  Patient is asymptomatic.  She denies any increased pain discomfort swelling diarrhea and has been afebrile  Severe anemia most likely malnutrition related  Patient has been tolerating her IV antibiotics well.  My major concern is that she has had a jump of white count to 19.9 thousand from previous of 9000.  No suspicion for C. difficile colitis or any other secondary infection joints were examined they seem to be stable  Plan is to order urgent recheck labs to make sure these labs were correct.  In addition family was updated and if there is any concern we will send her to the hospital for urgent imaging and evaluation  Her recheck labs came back at 15,000 and infectious disease was updated  However her CRP was only 1.5 now which is a significant improvement and looking at clinically patient appears to be stable with no evidence of any decline it was decided to continue with her IV antibiotics as scheduled she will follow-up with orthopedics closely.  She will need an outpatient workup for her anemia  Total time spent was 35 minutes, more than half of it was in face-to-face counseling regarding disease state, treatment, side  effects, documentation, review of clinical data and coordination of care  Electronically signed by: IRAM Dickinson  This progress note was completed using Dragon software and there may be grammatical errors.

## 2021-07-20 NOTE — PROGRESS NOTES
Met with patient, friend Olegario and Olegario's mom Maryse to review role of care management, progression of care and possible need for services at discharge, including Op services, home care, or skilled nursing care. Patient alert, oriented and engaged in the conversation. Pt lives with Olegario and Maryse and her daughter. Pt was independent with cares prior to ER admission and did not use any assistive devices. Pt was still driving. Pt anticipates return home with friend when medically stable. Pending progression of care pt may need additional cares/services and possible ride at d/c. CM to follow-up with pt for discharge needs.

## 2021-07-20 NOTE — PROGRESS NOTES
Ortho Note:    Patient remains stable.  Reports no new complaints.      S/P right ankle I&D and left shoulder arthroscopic I&D    Patient is awake and alert  Calves are soft and nontender  Left ankle incision area appears stable with no drainage.    Left shoulder dressing is CDI  Patient can wiggle left toes  Patient has stable left hand, wrist, and elbow ROM    POD #2 S/P left shoulder arthroscopic I&D and open right ankle I&D    PLAN:  Continue PT/OT              Continue current cares              Discharge per medical team when appropriate      Chiki Moore PA-C  [facility]

## 2021-07-20 NOTE — PROGRESS NOTES
"Progress Notes by Hilario Rubio MD at 8/20/2017  9:53 AM     Author: Hilario Rubio MD Service: Infectious Disease Author Type: Physician    Filed: 8/20/2017  9:54 AM Date of Service: 8/20/2017  9:53 AM Status: Signed    : Hilario Rubio MD (Physician)       Infectious Disease Progress Note    Assessment/Plan  Impression: Strep pneumoniae bacteremia and septic arthritis of the right ankle and left shoulder.  Transesophageal echocardiography was negative for vegetations.     Recommendations: Changed antibiotic to ceftriaxone for two weeks, then po cefdinir for two weeks.      OK to discharge to home anytime from Infectious Disease standpoint.    We'll sign off.  Please call if questions or problems.           Principal Problem:    Septic arthritis  Active Problems:    Polyarthropathy    Sepsis, due to unspecified organism    Anemia, unspecified type    Hypomagnesemia    Iron deficiency anemia    Weakness    Malnutrition      Hilario Rubio MD  [phone]      Subjective  Feels better.  Less pain.    Objective    Vital signs in last 24 hours  Temp:  [97  F (36.1  C)-98.5  F (36.9  C)] 97.7  F (36.5  C)  Heart Rate:  [62-84] 80  Resp:  [16-24] 16  BP: (114-144)/(78-90) 144/86  Weight:   133 lb (60.3 kg)    Intake/Output last 3 shifts  I/O last 3 completed shifts:  In: 1845.4 [P.O.:820; I.V.:525.4; Blood:500]  Out: -   Intake/Output this shift:       Review of Systems   Pertinent items are noted in HPI., otherwise negative.    Physical Exam  /86 (Patient Position: Lying)  Pulse 80  Temp 97.7  F (36.5  C) (Oral)   Resp 16  Ht 5' 6\" (1.676 m)  Wt 133 lb (60.3 kg)  LMP 08/02/2017  SpO2 97%  BMI 21.47 kg/m2  General appearance: alert, appears stated age and cooperative  Head: Normocephalic, without obvious abnormality, atraumatic  Eyes: Extraocular muscles intact, no icterus.  Throat: lips, mucosa, and tongue normal; teeth and gums normal  Lungs: Normal respiratory pattern, no " distress  Extremities: Right ankle dressed    Skin: Skin color, texture, turgor normal. No rashes or lesions  Neurologic: Grossly normal    Pertinent Labs   Lab Results: personally reviewed.       Results from last 7 days  Lab Units 08/20/17  0609 08/19/17  0609 08/18/17  0551   LN-WHITE BLOOD CELL COUNT thou/uL 9.4 9.6 8.0   LN-HEMOGLOBIN g/dL 7.8* 6.9* 7.2*   LN-HEMATOCRIT % 23.1* 20.0* 21.4*   LN-PLATELET COUNT thou/uL 107* 103* 116*          Results from last 7 days  Lab Units 08/20/17  0609 08/19/17  0609 08/18/17  1640 08/18/17  0551   LN-SODIUM mmol/L 132* 130*  --  130*   LN-POTASSIUM mmol/L 4.1 4.0 3.7 3.5   LN-CHLORIDE mmol/L 103 107  --  105   LN-CO2 mmol/L 30 24  --  26   LN-BLOOD UREA NITROGEN mg/dL 8 7*  --  12   LN-CREATININE mg/dL 0.67 0.63  --  0.66   LN-CALCIUM mg/dL 8.0* 7.7*  --  7.9*       HIV negative.    Microbiology Results (last 7 days)        Procedure Component Value Units Date/Time       Culture and Gram Stain, Drainage [[id number]] (Abnormal)  Collected: 08/16/17 0953       Order Status: Completed Specimen: Body Fluid from Joint, Other Updated: 08/18/17 1403        Culture 4+ Streptococcus pneumoniae (!)        Gram Stain Result 4+ Polymorphonuclear leukocytes         4+ Gram positive cocci in pairs         2+ Gram positive cocci in chains         Susceptibility         Streptococcus pneumoniae         ABIGAIL        Ceftriaxone 0.5  Sensitive        Ceftriaxone (Meningitis) Sensitive        Ceftriaxone (NonMeningitis) Sensitive        Penicillin 2  Resistant        Penicillin g (Meningitis) Resistant        Penicillin G (NonMeningitis) Sensitive        Penicillin G (Oral) Resistant                              Culture/Gram Stain: Body Fluid [[id number]] (Abnormal) Collected: 08/16/17 1406       Order Status: Completed Specimen: Body Fluid from Other Updated: 08/18/17 1327        Culture 1+ Alpha Strep to be Identified (!)        Gram Stain Result No polymorphonuclear leukocytes seen         No  organisms seen         Results reviewed and amended by microbiology staff          See corrected result below         4+ Polymorphonuclear leukocytes         No organisms seen       Culture, Blood Positive Work-up [[id number]] (Abnormal) Collected: 08/16/17 0653       Order Status: Completed Specimen: Blood Updated: 08/18/17 0756        Culture Streptococcus pneumoniae (!)         Susceptibility testing done on previous culture   Reported and sent to MD as part of the  Emerging Infections Surveillance Program.           Gram Stain Result Gram positive cocci       Culture, Blood Positive Work-up [[id number]] (Abnormal)  Collected: 08/16/17 0526       Order Status: Completed Specimen: Blood Updated: 08/18/17 0752        Culture Streptococcus pneumoniae (!)         Reported and sent to MD as part of the  Emerging Infections Surveillance Program.           Gram Stain Result Gram positive cocci         Susceptibility         Streptococcus pneumoniae         ABIGAIL        Ceftriaxone 0.5  Sensitive        Ceftriaxone (Meningitis) Sensitive        Ceftriaxone (NonMeningitis) Sensitive        Penicillin 2  Resistant        Penicillin g (Meningitis) Resistant        Penicillin G (NonMeningitis) Sensitive        Penicillin G (Oral) Resistant                              Culture, Urine [[id number]] Collected: 08/17/17 1230       Order Status: Completed Specimen: Urine Updated: 08/18/17 0727        Culture No Growth       Chlamydia trachomatis & Neisseria gonorrhoeae, Amplified Detection [[id number]] (Normal) Collected: 08/16/17 0839       Order Status: Completed Specimen: Body Fluid Updated: 08/17/17 1320        Chlamydia trachomatis, Amplified Detection Negative        Neisseria gonorrhoeae, Amplified Detection Negative       Culture/Gram Stain: Body Fluid [[id number]]        Order Status: Sent Specimen: Body Fluid from Shoulder, Left           Pertinent Radiology   Radiology Results: Personally reviewed impression/s  No results  found.

## 2021-07-20 NOTE — PROGRESS NOTES
[facility]    Facility:   [facility] [[id number]]   Code Status: FULL CODE  PCP: No Primary Care Provider   Phone: None   Fax: [phone]      CHIEF COMPLAINT/REASON FOR VISIT:  Chief Complaint   Patient presents with     Discharge Summary       HISTORY COURSE:  Lauryn Raya is a 46 y.o. female admitted to the TCU as a transfer from the hospital and was examined in the presence of both her parents.  This is a previously healthy 46-year-old with underlying history of depression or chronic back pain who presented to the hospital for evaluation of bilateral shoulder pain with hip and foot pain she was also noted to be very confused at the time of her presentation.  Had presented to her primary care physician for similar complaints about 2 weeks ago prior to this hospitalization and was felt to have musculoskeletal aches and pains and given symptomatic treatment.  Orthopedics saw this patient, a CT of the right ankle showed no fractures or dislocation but soft tissue swelling and tibiotalar joint effusion and it was felt with the setting of multiple septic joints and a toxic appearance and abnormal labs she should have surgery so she underwent open irrigation and debridement of her right ankle with arthroscopic irrigation and debridement of her left shoulder done on 8/16/17   her cultures grew alpha hemolytic strep bacteremia with septic arthritis right ankle. a transesophageal echocardiogram was negative for any vegetations and she is currently on 2 weeks of IV ceftriaxone and subsequently she will switch to oral antibiotics with outpatient follow-up with infectious disease  She had an increase in her white count to 19.9 thousand and a workup was again initiated in the TCU.  She was afebrile no diarrhea imaging of her ankle in her shoulder were repeated with did show some soft tissue swelling  Subsequently a CRP was repeated which was 1.5which is a significant  improvement.  Subsequently infectious disease was consulted.  I feel comfortable discontinuing her antibiotics on 9/3 as scheduled with her switching to oral antibiotics  She was also seen by orthopedics today because she was having increased bloody drainage from her right ankle and some of this was expressed while she was in the TCU with about 10 mL of fluid removed  Also felt comfortable also that she is doing well with no concerns for reimaging or any other workup on her ankle or her shoulder  He has enough support and home with both her parents as well as her boyfriend and other multiple family members and family feels comfortable managing her IV antibiotics also at home  Please note that she has remained anemic and a discharge hemoglobin's remains low at 7.7 she was given 1 unit of blood transfusion in the hospital and have advised that she needs to have a follow-up visit closely with her primary care physician when she is done with antibiotics to ensure that she has resolution of her anemia  Is not on any restrictive diet.  I am a little concerned about her anemia and she may need additional workup for this.  Does not have any heavy vaginal bleeding either    Review of Systems   Constitutional: Negative.  Negative for fever, chills, HAS activity change, appetite change and fatigue.   HENT: Negative for congestion and facial swelling.    Eyes: Negative for photophobia, redness and visual disturbance.   Respiratory: Negative for cough and chest tightness.    Cardiovascular: Negative for chest pain, palpitations and leg swelling.   Gastrointestinal: Negative for nausea, diarrhea, constipation, blood in stool and abdominal distention.   Genitourinary: Negative.    Musculoskeletal: Negative.pain in ankle and shoulder    Skin: Negative.    Neurological: Negative for dizziness, tremors, syncope, weakness, light-headedness and headaches.   Hematological: Does not bruise/bleed easily.   Psychiatric/Behavioral:  Negative.      Vitals:    08/30/17 1546   BP: 121/65   Pulse: 72   Resp: 17   Temp: 98  F (36.7  C)       Physical Exam   GENERAL: no acute distress. Cooperative in conversation.   HEENT: pupils are equal, round and reactive. Oral mucosa is moist and intact.  RESP:Chest symmetric. Regular respiratory rate. No stridor.  ABD: Nondistended, soft.  EXTREMITIES: No lower extremity edema.  Right ankle incision is stable.  Minimal serosanguineous blood-tinged drainage noted but otherwise no other finding  Left ankle incision stable  NEURO: non focal. Alert and oriented x3.   PSYCH: within normal limits. No depression or anxiety.  SKIN: warm dry intact     MEDICATION LIST:  Updated Medication list, printed and signed at discharge, please refer to that final medication list from the [facility] for accuracy.    Current Outpatient Prescriptions   Medication Sig     acetaminophen (TYLENOL) 325 MG tablet Take 2 tablets (650 mg total) by mouth every 6 (six) hours as needed.     aspirin 325 MG tablet Take 1 tablet (325 mg total) by mouth daily.     busPIRone (BUSPAR) 10 MG tablet Take 20 mg by mouth 2 (two) times a day.     [START ON 9/1/2017] cefdinir (OMNICEF) 300 MG capsule Take 1 capsule (300 mg total) by mouth 2 (two) times a day for 14 days. Start after finish IV antibiotics     cefTRIAXone 2 g in NaCl 0.9 % 0.9 % 50 mL IVPB Infuse 2 g into a venous catheter daily for 14 days.     ferrous sulfate (FERROUSUL) 325 (65 FE) MG tablet Take 1 tablet (325 mg total) by mouth daily with breakfast.     FLUoxetine (PROZAC) 40 MG capsule Take 40 mg by mouth daily.     gabapentin (NEURONTIN) 100 MG capsule Take 100-200 mg by mouth at bedtime as needed (for nerve pain).     ibuprofen (ADVIL,MOTRIN) 200 MG tablet Take 400 mg by mouth every 6 (six) hours as needed for pain.     magnesium oxide 250 mg Tab Take by mouth daily.     melatonin 3 mg Tab tablet Take 3 mg by mouth at bedtime.     omeprazole (PRILOSEC) 20 MG capsule  Take 1 capsule (20 mg total) by mouth daily before breakfast.     oxyCODONE (ROXICODONE) 5 MG immediate release tablet Take 1-2 tablets (5-10 mg total) by mouth every 4 (four) hours as needed.     rizatriptan (MAXALT) 10 MG tablet Take 10 mg by mouth as needed for migraine (may repeat x1 in 2 hours, maximum of 3 tablets/24 hours). May repeat in 2 hours if needed     senna-docusate (PERICOLACE) 8.6-50 mg tablet Take 1 tablet by mouth 2 (two) times a day.     Recent Results (from the past 240 hour(s))   Creatinine   Result Value Ref Range    Creatinine 0.81 0.60 - 1.10 mg/dL    GFR MDRD Af Amer >60 >60 mL/min/1.73m2    GFR MDRD Non Af Amer >60 >60 mL/min/1.73m2   AST (SGOT)   Result Value Ref Range    AST 37 0 - 40 U/L   HM1 (CBC with Diff)   Result Value Ref Range    WBC 19.9 (H) 4.0 - 11.0 thou/uL    RBC 2.77 (L) 3.80 - 5.40 mill/uL    Hemoglobin 9.0 (L) 12.0 - 16.0 g/dL    Hematocrit 27.7 (L) 35.0 - 47.0 %     80 - 100 fL    MCH 32.5 27.0 - 34.0 pg    MCHC 32.5 32.0 - 36.0 g/dL    RDW 13.7 11.0 - 14.5 %    Platelets 226 140 - 440 thou/uL    MPV 9.7 8.5 - 12.5 fL   Manual Differential   Result Value Ref Range    Total Neutrophils % 61 50 - 70 %    Lymphocytes % 20 20 - 40 %    Monocytes % 5 2 - 10 %    Eosinophils %  2 0 - 6 %    Basophils % 0 0 - 2 %    Metamyelocytes % 7 (H) <=1 %    Myelocytes % 5 (H) <=1 %    Other Cells % 2 (H) <=0 %    Total Neutrophils Absolute 12.0 (H) 2.0 - 7.7 thou/ul    Lymphocytes Absolute 4.0 0.8 - 4.4 thou/uL    Monocytes Absolute 0.9 0.0 - 0.9 thou/uL    Eosinophils Absolute 0.4 0.0 - 0.4 thou/uL    Basophils Absolute 0.0 0.0 - 0.2 thou/uL    Metamyelocytes Absolute 1.4 (H) <=0.1 thou/uL    Myelocytes Absolute 0.9 (H) <=0.1 thou/uL    Other Cells Absolute 0.3 0.0-<1.0 thou/uL    Manual nRBC per 100 Cells 1 (H) 0-<1    Reactive Lymphocytes 1+ (!) Negative    Platelet Estimate Normal Normal    Polychromasia 1+ (!) Negative   C-Reactive Protein (CRP)   Result Value Ref Range     CRP 1.5 (H) 0.0 - 0.8 mg/dL   HM1 (CBC with Diff)   Result Value Ref Range    WBC 15.5 (H) 4.0 - 11.0 thou/uL    RBC 2.38 (L) 3.80 - 5.40 mill/uL    Hemoglobin 7.7 (L) 12.0 - 16.0 g/dL    Hematocrit 23.8 (L) 35.0 - 47.0 %     80 - 100 fL    MCH 32.4 27.0 - 34.0 pg    MCHC 32.4 32.0 - 36.0 g/dL    RDW 14.0 11.0 - 14.5 %    Platelets 272 140 - 440 thou/uL    MPV 8.9 8.5 - 12.5 fL   Manual Differential   Result Value Ref Range    Total Neutrophils % 66 50 - 70 %    Lymphocytes % 24 20 - 40 %    Monocytes % 6 2 - 10 %    Eosinophils %  0 0 - 6 %    Basophils % 1 0 - 2 %    Metamyelocytes % 2 (H) <=1 %    Myelocytes % 1 <=1 %    Total Neutrophils Absolute 10.2 (H) 2.0 - 7.7 thou/ul    Lymphocytes Absolute 3.7 0.8 - 4.4 thou/uL    Monocytes Absolute 0.9 0.0 - 0.9 thou/uL    Eosinophils Absolute 0.0 0.0 - 0.4 thou/uL    Basophils Absolute 0.2 0.0 - 0.2 thou/uL    Metamyelocytes Absolute 0.3 (H) <=0.1 thou/uL    Myelocytes Absolute 0.2 (H) <=0.1 thou/uL    Platelet Estimate Normal Normal    Polychromasia 1+ (!) Negative       DISCHARGE DIAGNOSIS:    ICD-10-CM    1. Sepsis, due to unspecified organism A41.9    2. Pneumococcal arthritis of right ankle M00.171    3. Malnutrition E46    4. Anemia, unspecified type D64.9    5. Acute encephalopathy G93.40    6. Weakness R53.1    7. Depression F32.9        MEDICAL EQUIPMENT NEEDS:  NONE    DISCHARGE PLAN/FACE TO FACE:  I certify that services are/were furnished while this patient was under the care of a physician and that a physician or an allowed non-physician practitioner (NPP), had a face-to-face encounter that meets the physician face-to-face encounter requirements. The encounter was in whole, or in part, related to the primary reason for home health. The patient is confined to his/her home and needs intermittent skilled nursing, physical therapy, speech-language pathology, or the continued need for occupational therapy. A plan of care has been established by a  physician and is periodically reviewed by a physician.  Date of Face-to-Face Encounter:  8/30/17    I certify that, based on my findings, the following services are medically necessary home health services:  PT/OT/RN /HHA    My clinical findings support the need for the above skilled services because: (Please write a brief narrative summary that describes what the RN, PT, SLP, or other services will be doing in the home. A list of diagnoses in this section does not meet the CMS requirements.)  SEPTIC ARTHRITIS-NEED IV ABX TILL 9/3/17    This patient is homebound because: (Please write a brief narrative summary describing the functional limitations as to why this patient is homebound and specifically what makes this patient homebound.)   Joint infection/weakness    The patient is, or has been, under my care and I have initiated the establishment of the plan of care. This patient will be followed by a physician who will periodically review the plan of care.  Total time spent was 35 minutes, more than half of it was in face-to-face counseling regarding disease state, treatment, side effects, documentation, review of clinical data and coordination of care  Care plan was reviewed with the patient as well as her mother sitting at her bedside.  Close follow-up with primary care physician orthopedics as well as infectious disease recommended      Electronically signed by: IRAM Dickinson

## 2021-07-20 NOTE — OP NOTE
Name:  Lauryn Raya  :  1970  MRN:  [id number]  Procedure Date:  2017    Preoperative Diagnosis:  1. Septic arthritis right ankle  2. Septic arthritis left shoulder    Postoperative Diagnosis:  SAME    Procedures:  1.  Open irrigation and debridement right ankle  2.  Arthroscopic irrigation and debridement left shoulder    Surgeon(s) and Assistants (if any):  Surgeon(s):   ISRA Huang.   Circulator: Rozina Scott RN; Rekha Hernandez RN; Patricia Gavin, JAZMINE; Celestina Lynch RN  Physician Assistant: Celestina Avitia PA-C  Scrub Person: Dionne Tenorio; Kelly Tadeo; Kelly Rodriguez PA-C assistance was required for patient positioning, instrumentation assistance, soft tissue retraction and patient safety.      Anesthesia:  General    Drains: None    Specimens:  None    Complications: None    Findings/Conclusions: Thick, purulent appearing fluid in the right ankle joint.  Cloudy abnormal appearing fluid and left lateral humeral joint.  No fluid noted in subacromial space.    Estimated Blood Loss: 10 mL    Indication for procedure: Lauryn is a 46-year-old female has been having several days now of total joint pains.  Today is really localized down to her right ankle and left shoulder.  Aspiration was performed under ultrasound of the right ankle and shahnaz purulence was aspirated and cell count and Gram stain are consistent with infection.  She also was unable to move her left shoulder due to pain and there was a small amount of fluid noted in the left shoulder which was aspirated under x-ray.  There was no cell count back this was noted to be cloudy abnormal appearing fluid.  Patient also was having fevers and became confused in the emergency department and had a septic picture. I recommended irrigation and debridement of the infection. Risks and benefits of the surgery including wound complications, bleeding, persistent infection, need for more surgery, and damage to  nearby structures including vessels, nerves, tendons, muscles were discussed with the patient. They agreed to the procedure as noted above and informed consent was obtained.    Description of procedure: Patient was identified in the pre-operative holding area where the operative site was marked with indelible ink and informed consent was signed. The patient was then brought to the operating room suite and was administered general anesthesia. The right lower extremity was then prepped and draped in the usual sterile fashion. A timeout was performed confirming the patient's name, surgery to be performed, and surgical site.    After elevating the right lower extremity the tourniquet was inflated to 250 mmHg.  I then made an open approach to the medial ankle just medial to the tibialis anterior tendon.  Dissection was taken bluntly down to the level of the joint capsule and then using the Metzenbaum scissors I poked through the joint capsule itself.  I encountered approximately 10-15 mm of thick purulent fluid within the joint.  I used a synovial rondure to debride the synovial lining of the joint.  Using distraction and motion of the tibiotalar joint is able to express some more purulent fluid from the posterior portion of the joint.  Once debridement was complete I then irrigated the joint using a pulse lavage with 3 L of antibiotic saline.  Once irrigation was complete and then proceeded to closure.  This was done by leaving the joint capsule open and then closing the skin with a 2-0 PDS and 3-0 nylon.  Sterile dressing was applied.    After the drapes were removed we then repositioned the patient in the beachchair position on a Schlein positioner.  We made sure to well pad any bony prominences to make sure there were no pressure on her carotid arteries.  The left upper extremity was then prepped and draped in the usual sterile fashion.  Another timeout was performed for this portion of the procedure once again  confirming the patient's name, surgical performed, and the surgical site.    Standard posterior arthroscopic portal was made within the glenohumeral joint.  There was noted to be 1-2 mL of thick cloudy fluid that came out of the joint at this time.  A diagnostic arthroscopy was then performed of the joint which noted that there was some inflammation of the synovial lining.  The articular cartilages were intact, as was the rotator cuff and labrum.  I then established an anterior working portal in the rotator interval.  I then irrigated the joint with 3 L of antibiotic saline.  Once irrigation was complete I then placed the scope in the subacromial space which was also noted to be red and inflamed, but there is no shahnaz fluid noted within the space.  I then irrigated the subacromial space with 3 L of saline through the scope as well.  Instruments were then removed from the shoulder the arthroscopy portals were closed with a 3-0 nylon and covered with a sterile dressing.    She was then reversed and anesthesia and extubated.  The patient was then taken to the PACU in stable condition.     Condition on discharge from OR:  Satisfactory    Postoperative plan:  Admission to the hospitalist service  Would recommend infectious disease consult  She is to get a transesophageal echo to look for source of infection  Broad-spectrum antibiotics to be continued until final cultures are back  She can begin physical therapy for the ankle and shoulder to begin with motion tomorrow  She may weight-bear as tolerated on both extremities  We will reevaluate her in the morning    Gt Macias M.D.   Date: 8/16/2017  Time: 5:15 PM

## 2021-07-20 NOTE — DISCHARGE SUMMARY
[facility] MEDICINE  DISCHARGE SUMMARY     Primary Care Physician: No Primary Care Provider  Admission Date: 8/16/2017   Discharge Provider: Paulino Starks Discharge Date: 8/20/2017   Diet: regular diet Code Status: Full Code   Activity: activity as tolerated      Condition at Discharge: Good      REASON FOR PRESENTATION(See Admission Note for Details)   Septic ankle  Sepsis with strep pneumonia and blood culture   Hyponatremia   Rhabdomyolysis  Acute encephalopathy  Weakness  Malnutrition  Anemia  Depression      PRINCIPAL & ACTIVE DISCHARGE DIAGNOSES   Principal Problem:    Septic arthritis  Active Problems:    Polyarthropathy    Sepsis, due to unspecified organism    Anemia, unspecified type    Hypomagnesemia    Iron deficiency anemia    Weakness    Malnutrition        SIGNIFICANT FINDINGS (Imaging, labs):   These refer to EMR for details    PENDING LABS   None    PROCEDURES ( this hospitalization only)    OPEN IRRIGATION AND DEBRIDEMENT ANKLE, ARTHROSCOPIC IRRIGATION AND DEBRIDEMENT SHOULDER    RECOMMENDATION FOR F/U VISIT   Monitor joints pain    DISPOSITION   TCU    SUMMARY OF HOSPITAL COURSE:      46 years old female  with past medical history of depression and chronic back pain who presents  to the emergency room for evaluation of bilateral shoulder pain, right hip pain and right foot pain.  She was admitted, please refer to H&P for details      Multiple joint pain with right ankle septic joint  - Etiology is unclear, patient denies IV drug abuse  - Orthopedic consult is appreciated  - S/P incision and drainage of right ankle and left shoulder  - Empiric antibiotic with vancomycin and Zosyn  - Negative chlamydia and gonorrhea and HIV  - Positive blood culture for strep pneumonia  - ID consult is appreciated  - Negative transesophageal echo  for endocarditis       Sepsis   - Source is probably septic joints  - Positive blood culture for strep pneumonia  - Continue IV fluid and antibiotics  - ID  consult is appreciated  - 2 weeks of IV antibiotics followed by 2 weeks of oral antibiotics as per ID   - Placed PICC line  - Repeat blood culture is negative    Hyponatremia  - Secondary to hypovolemia  - Improving with IV fluid   - D/Continue IV fluid  - CT head is unremarkable for acute changes      Rhabdomyolysis  - Etiology is unclear  - Resolved  - D/Continue IV fluids      Acute encephalopathy  - Metabolic, secondary to the above  - Patient is more awake and today after D/C scheduled narcotics  - Continue supportive care  - Delirium order set  - CT head is unremarkable for acute changes  - Improved before discharge      Anemia  - Acute on chronic  - Part is hemodilution secondary to aggressive IV hydration  - Drop Hb today to 6.9, improved to 7.8 after transfusion of 1 unit of blood  - Low iron studies, normal vitamin B12, folic acid, and negative Hemoccult blood  - Started IV iron  - Discharge on iron supplement  - Transfused one unit of blood  - Continue to monitor Hb as outpatient      Chronic back pain  - MRI is negative for osteomyelitis/discitis  - Pain control with as needed oxycodone  - Discontinue scheduled oxycodone as patient is very lethargic  - PT evaluation recommend TCU discharge  - Discontinue tramadol      Hypokalemia  - Replaced per protocol      Hypomagnesemia  - Replace per protocol      Depression  - Resume home medications     Malnutrition   - Moderate   - Started on supplement       Weakness and deconditioning  - Secondary to the above  - PT/OT evaluation  - Plan for TCU on discharge      Code status :  Full code      Discussed with family , patient, orthopedic, ID , nursing staff and discharge planner         Discharge Medications with Med changes:        Medication List      START taking these medications          acetaminophen 325 MG tablet   Quantity:  100 tablet   Dose:  650 mg   Commonly known as:  TYLENOL   650 mg, Oral, Q6H PRN       aspirin 325 MG tablet   Quantity:  30 tablet    Dose:  325 mg   325 mg, Oral, DAILY       cefdinir 300 MG capsule   Quantity:  28 capsule   Dose:  300 mg   Start taking on:  9/1/2017   Commonly known as:  OMNICEF   300 mg, Oral, BID, Start after finish IV antibiotics       cefTRIAXone 2 g in NaCl 0.9 % 0.9 % 50 mL IVPB   Quantity:  1 each   Dose:  2 g   2 g, Intravenous, Q24H       ferrous sulfate 325 (65 FE) MG tablet   Dose:  1 tablet   Commonly known as:  FERROUSUL   1 tablet, Oral, Daily with brkfst       omeprazole 20 MG capsule   Dose:  20 mg   Commonly known as:  PriLOSEC   20 mg, Oral, QAM AC       oxyCODONE 5 MG immediate release tablet   Quantity:  60 tablet   Dose:  5-10 mg   Commonly known as:  ROXICODONE   5-10 mg, Oral, Q4H PRN       senna-docusate 8.6-50 mg tablet   Quantity:  60 tablet   Dose:  1 tablet   Commonly known as:  PERICOLACE   1 tablet, Oral, BID         CONTINUE taking these medications          busPIRone 10 MG tablet   Dose:  20 mg   Commonly known as:  BUSPAR   20 mg, Oral, BID       FLUoxetine 40 MG capsule   Dose:  40 mg   Commonly known as:  PROzac   40 mg, Oral, DAILY       gabapentin 100 MG capsule   Dose:  100-200 mg   Commonly known as:  NEURONTIN   100-200 mg, Oral, Bedtime PRN       ibuprofen 200 MG tablet   Dose:  400 mg   Commonly known as:  ADVIL,MOTRIN   400 mg, Oral, Q6H PRN       rizatriptan 10 MG tablet   Dose:  10 mg   Commonly known as:  MAXALT   10 mg, Oral, PRN, May repeat in 2 hours if needed          STOP taking these medications          methylPREDNISolone 4 mg tablet   Commonly known as:  MEDROL DOSEPACK       traMADol 50 mg tablet   Commonly known as:  ULTRAM                 Rationale for medication changes:      Antibiotic course as recommended by ID      Consult/s:  ID and orthopedic surgery      Discharge Orders  Diet - Regular     Up walking as tolerated with assist and assistive device as needed     Weight Bearing Status   Order Specific Question Answer Comments   Weight bearing status WBAT  (Weight-bearing As Tolerated)      Instructions for Home Care/TCU RN - If patient has Intact Blisters: Leave open to air.     Instructions for Home Care/TCU RN - If patient has Open/leaking blisters: cleanse with NS, cover with adaptic/Vaseline dressing, then cover with dry gauze.     Instructions for Home Care/TCU RN - May use steri strips to reinforce incision as needed     Wear Ace wraps, knee high viktoria hose, or tubigrip to lower legs.  Ok to wear during the day, remove and wash at night.     Ice pack to wound 20 minutes at a time and as needed to reduce pain and/or swelling     All refills should be directed towards your surgeons office. If you need a refill, please call before you run out, as most offices do not fill narcotics outside of normal business hours.     For any concerns related to your surgery, please call your surgeon s office on Monday through Friday from 7:30am to 5pm.    Order Comments: For any concerns related to your surgery, please call your surgeon s office on Monday through Friday from 7:30am to 5pm.   During non-business hours, you can reach the provider on-call at the same clinic number, or you can go to the orthopaedic urgent care walk-in clinic.           Advance diet as tolerated     Follow home exercise program as described by hospital Physical Therapist     Discharge Follow Up   Order Comments: Follow-up appointment with surgeon: Dr Macias's team as scheduled or in 2 weeks. Please call [phone] to schedule an appointment.     Nursing communication   Order Comments: [facility]  Post Discharge orders    Admit to home care.    Fax laboratory test results to: [phone], Attention: Hilario Rubio MD      Diagnosis: Pneumococcal septic arthritis    Routine PICC line cares and flushing.  Please perform the following tests, weekly:     CBC, diff, platelets     Creatinine     SGOT       C-reactive protein        Call [phone] to schedule follow-up  appointment in 3 weeks    @SI     Discharge Condition:  Improving     Discharge Summary:  Enclosed     Admission H&P Valid:  Yes     Patient Aware of Diagnosis: Yes     Free of Communicable Disease:   Yes     Rehab Potential:  Good     Discharge Potential:  Length of Stay < 30 Days     Level of Care:  Skilled     Activity:  As Tolerated     Vital Signs Per Facility Protocol     Treatment Options: Full Resuscitation     Physical Therapy Eval and Treat     Occupational Therapy Eval and Treat     Nutritionist     Follow-up: Next Physician Nursing Home Rounds     Follow Up Appointments:   Order Comments: Orthopedics and ID as scheduled     Electronically Signed       Examination     Vital Signs in last 24 hours:   Temp:  [97  F (36.1  C)-98.5  F (36.9  C)] 97.7  F (36.5  C)  Heart Rate:  [62-84] 80  Resp:  [16-24] 16  BP: (114-144)/(78-90) 144/86  SpO2:  [97 %-99 %] 97 %  Physical Exam  General: Well developed , sitting comfortable on bed , No apparent distress  Head: Normocephalic, atraumatic  Eyes: Pupils equal, round and reactive to light   Mouth: Mucus membranes moist  Neck: Supple, No JVD  Cardiovascular: Regular rate and rhythm, Normal S1, S2  Lungs: Clear to auscultation bilaterally, no wheeze.  Abdomen: Soft, Non-tender, not distended, Bowel sounds present  Extremities: No edema, no clubbing, dressing applied to right ankle and left shoulder  Skin: Warm and well-perfused without lesions.  Neurologic: lethargic,  face is symmetric, Moves all extremities equally  Psychiatry : appropriate with examiner           Please see EMR for more detailed significant labs, imaging, consultant notes etc.  Total time spent on discharge: 36 minutes    Paulino Starks MD   CC:No Primary Care Provider

## 2021-07-20 NOTE — PROGRESS NOTES
Met with patient, parent's present to introduce care management role, progression of care, and possible services at discharge- including but not limited to: outpatient infusion, home care services, TCU or other options.    RNCM discussed PT/OT recommendations and benefits that of TCU for ongoing therapy with patient and family.  Patient would like to discharge to TCU for ongoing rehab needs and long term infusion needs.  Provided TCU list. Patient would like referrals placed to [facility] and [facility]; referrals made. Transportation options and potential out of pocket expenses discussed. [facility]. Weekend CM to follow.    9385 update:  Met with pt, SO and pt's mother as requested to assist with discharge needs. Pt and SO would like writer to place a referral to [facility].  TCU preference is that of [facility] TCU.

## 2021-07-20 NOTE — CONSULTS
ORTHOPEDIC CONSULTATION    CHIEF COMPLAINT: right ankle and left shoulder pain      HISTORY OF PRESENT ILLNESS:  The patient is seen in orthopedic consultation at the request of Dr. Martinez.  The patient is a 46 y.o. female with c/o right ankle and left shoulder pain.  She reports that about a week ago she developed a slow onset of right ankle pain, as well as some generalized pain in the bilateral shoulders and right hip. This has been progressive over the last several days and this AM was unable to bear weight on her right leg due to severe pain in the right ankle region. She says that the hip pain has resolved and her right shoulder doesn't really bother her anymore, but she also is unable to really move her left shoulder 2/2 pain. She has been having subjective fevers and chills at home. She denies any trauma, and no history of any joint issues.      PAST MEDICAL HISTORY:   No past medical history on file.    ALLERGIES:   Review of patient's allergies indicates no known allergies.    MEDICATIONS ON ADMISSION:  None    SOCIAL HISTORY:     Denies tobacco use    FAMILY HISTORY:  No family history on file.    REVIEW OF SYSTEMS:   Negative for chest pain and shortness of breath  Constitutional signs: yes, fevers, chills, fatigue, malaise    PHYSICAL EXAMINATION:    Temp:  [98.6  F (37  C)-100.2  F (37.9  C)] 98.6  F (37  C)  Heart Rate:  [104-127] 107  Resp:  [22-29] 29  BP: (124-150)/(77-97) 125/79    General: On examination, the patient is A&Ox3, but she does have a toxic appearance  SKIN/HAIR/NAILS: erythema over the right ankle and foot. Intact over the shoulders and hips with no erythema. Appreciable effusion in the right ankle joint, and the left shoulder feels full compared to the right.  Pulses:  Dorsalis Pedis, Radial and Distal Capillary Refill pulses intact  Sensation: intact bilateral Lower and Upper extremities  Tenderness: right ankle and left shoulder  ROM: Limited Including unable to plantar/dorsiflex  the right ankle due to pain. Full ROM of the right shoulder, but only able to passively forward flex the left shoulder about 30 degrees with severe pain.  Crepitus: None  Motor: intact distally in right foot: +ehl/fhl, and intact in left arm: thumb/wrist extension, 1st dorsal interosseous, , and elbow flex/ext    RADIOGRAPHIC EVALUATION:  X-rays from 8/14/2017 at [facility] of left shoulder: negative for fracture or dislocation.    CT of right ankle: overlying soft tissue swelling and tibiotalar joint effusion. No fractures or dislocations      LABORATORY DATA:   No results found for: INR    Joint fluid analysis from an IR aspiration of the right ankle reveals WBC of 349,668 and purulence was noted by the radiologist    WBC: 16.5    CRP 20.9      IMPRESSION:  46 year old female with septic arthritis right ankle and likely septic arthritis left shoulder     PLAN:  Unclear why a previously healthy 46 year old would have multiple septic joints, but with her toxic appearance and laboratory studies, I think she warrants an urgent I&D of the affected joints. We will try to obtain an IR aspiration of the shoulder to confirm before surgery. Plan for open ankle I&D on the right ankle arthroscopic left shoulder I&D this afternoon in the OR. All her questions were answered and she desires to proceed.    Thank you for the consultation and involving [facility] in the care of your patient.    Gt Macias MD  [facility]

## 2021-07-20 NOTE — PROGRESS NOTES
Gabriel Cognitive Level Screen(ACLS)  The ACLS is a screening tool designed to assess cognition, including: global cognitive processing capacity, learning potential, and performance abilities. This cognitive assessment tool requires that the person attend to, understand, and use sensory and motor cues, your verbal and demonstrated instructions and cues, and feedback from motor actions.  Patient Score/Interpretation: 4.2  Level 4.2:   Patient requires 24-hour supervision to remove dangerous objects outside of the visual field and to solve any problems arising from minor changes in the environment.  Patient may spend a daily allowance, walk to familiar locations in the neighborhood, or follow a simple, familiar bus route    8/18/2017 by Mary T Reyes, OT

## 2021-07-20 NOTE — PROGRESS NOTES
NURSING TRANSFER AND ARRIVAL NOTE  :    Patient Name: Lauryn Raya  : 1970  MRN: [id number]  Patient Location: N421/N421-01        The reason for patient transfer is change level of care and provider order.  The patient was received from PACU via via cart/stretcher.  Equipment used for transport Oxygen  nasal cannula.     Receiving Unit Action:  Received report from Adeline at 1830 via verbal by phone.  Oriented patient to surroundings.   Call light within reach.     Response:  Patient tolerated transfer.    Vanessa Altamirano

## 2021-07-20 NOTE — H&P
Admission History and Physical   Lauryn Raya,  1970, MRN [id number]    [facility]  Septic arthritis [M00.9]    PCP: No Primary Care Provider, None   Code status:  No Order       Extended Emergency Contact Information  Primary Emergency Contact: Olegario Hudson   [facility]  Home Phone: [phone]  Relation: Friend       Date of Service: 2017    Assessment and Plan:  46 years old female  with past medical history of depression and chronic back pain who presents  to the emergency room for evaluation of bilateral shoulder pain, right hip pain and right foot pain.  She was admitted with    Multiple septic joints  - Etiology is unclear, patient denies IV drug abuse  - Orthopedic consult is appreciated  - S/P incision and drainage of right ankle and left shoulder  - Empiric antibiotic with vancomycin and Zosyn  - Check chlamydia and gonorrhea and urine drug screen  - F/U on blood culture  - Will consult ID  - Check transesophageal echo    Hyponatremia  - Secondary to hypovolemia  - IV fluid  - Monitor sodium level  - CT head is unremarkable for acute changes    Rhabdomyolysis  - Etiology is unclear  - Continue IV fluids hydration  - Monitor CK level    Acute encephalopathy  - Metabolic, secondary to the above  - Continue supportive care  - CT head is unremarkable for acute changes    Chronic back pain  - MRI is negative for osteomyelitis/discitis  - Pain control  - PT evaluation once medically    Hypokalemia  - Replace per protocol  - Check magnesium level    Depression  - Resume home medications    Code status :  Full code    Discussed with patient , family and nursing staff     VTE prophylaxis:SCDs  Disposition: Home  Barrier to Discharge: IV antibiotic  Length of Stay: Anticipate greater than 2 midnight hospitalization for evaluation and treatment of multiple septic joints    Chief Complaint: Multiple joint pain and swelling    HPI: History is limited by patient  confusion    Lauryn Raya is a 46 years old female  with past medical history of depression and chronic back pain who presents  to the emergency room for evaluation of bilateral shoulder pain, right hip pain and right foot pain.  She relates having onset of some discomfort in her shoulders and hips recently.  She reports being seen in the clinic and started on medications which is not helping. the pain appears to be intensifying especially in the right foot.  When she awakened this morning her right foot and ankle are extremely swollen and terribly painful such that she not walk.  She denies any obvious injuries trauma to her joints, she had fever at home but no chills, patient denies chest pain or shortness of breath, she denies any history of recent travel or IV drug abuse.    Medical History  Reviewed by myself with patient  There are no active non-hospital problems to display for this patient.    No past medical history on file.  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   No past surgical history on file.    Allergies  Reviewed by myself with patient  No Known Allergies    Prior to Admission Medications   Reviewed by myself with patient and finalized by admitting team    (Not in a hospital admission)    Social History  Reviewed by myself with patient  she    Social History   Substance Use Topics     Smoking status: Not on file     Smokeless tobacco: Not on file     Alcohol use Not on file       Family History  Reviewed by myself with patient  Negative for coronary artery disease and diabetes  family history is not on file.    Review Of Systems: 12-point ROS negative, except as noted in HPI      Physical Exam:  Sleeping comfortably on bed in no acute distress  /74  Pulse (!) 109  Temp 98.6  F (37  C) (Oral)   Resp 27  Wt 130 lb  (59 kg)  LMP 08/02/2017  SpO2 100%  There is no height or weight on file to calculate BMI.    General : Alert, cooperative, no distress, appears stated age  Skin:  Skin color, texture, turgor normal, no rashes or lesions  Lymph nodes: No adenopathy  Head:  Normocephalic, without obvious abnormality, atraumatic  HEENT: PERRL, conjunctiva/corneas clear, EOM's intact, no scleral icterus  Throat:  Lips, mucosa, and tongue normal; teeth and gums normal  Neck:  Supple, thyroid not enlarged, no LAD  Back:  No CVA tenderness  Lungs:  Clear to auscultation bilaterally, respirations unlabored, no rales, rhonchi or wheezes  Chest Wall:No tenderness or deformity  Heart: Regular rate and rhythm, S1, S2 normal, no murmur, rub or gallop  Abdomen: Soft, non-tender, no guarding, bowel sounds active,  no masses, no organomegaly  Extremities :normal, atraumatic, no cyanosis or edema, pulses normal, positive tenderness and swelling of right ankle and left shoulder, resume range of motion secondary to pain  Neurologic: Lethargic oriented to person only , Cranial Nerves II-XII normal, moves all extremities equally, no focal findings  Psychiatry : appropriate with examiner       Results for orders placed or performed during the hospital encounter of 08/16/17   Culture and Gram Stain, Drainage   Result Value Ref Range    Gram Stain Result 4+ Polymorphonuclear leukocytes     Gram Stain Result 4+ Gram positive cocci in pairs     Gram Stain Result 2+ Gram positive cocci in chains    Basic Metabolic Panel   Result Value Ref Range    Sodium 120 (L) 136 - 145 mmol/L    Potassium 3.3 (L) 3.5 - 5.0 mmol/L    Chloride 93 (L) 98 - 107 mmol/L    CO2 24 22 - 31 mmol/L    Anion Gap, Calculation 3 (L) 5 - 18 mmol/L    Glucose 136 (H) 70 - 125 mg/dL    Calcium 11.1 (H) 8.5 - 10.5 mg/dL    BUN 15 8 - 22 mg/dL    Creatinine 0.86 0.60 - 1.10 mg/dL    GFR MDRD Af Amer >60 >60 mL/min/1.73m2    GFR MDRD Non Af Amer >60 >60 mL/min/1.73m2   Hepatic Profile    Result Value Ref Range    Bilirubin, Total 1.0 0.0 - 1.0 mg/dL    Bilirubin, Direct 0.4 <=0.5 mg/dL    Protein, Total 12.3 (H) 6.0 - 8.0 g/dL    Albumin 1.8 (L) 3.5 - 5.0 g/dL    Alkaline Phosphatase 47 45 - 120 U/L    AST 45 (H) 0 - 40 U/L    ALT 27 0 - 45 U/L   Lactic Acid   Result Value Ref Range    Lactic Acid 1.2 0.5 - 2.2 mmol/L   CK   Result Value Ref Range    CK, Total 359 (H) 30 - 190 U/L   C-Reactive Protein   Result Value Ref Range    CRP 20.9 (H) 0.0 - 0.8 mg/dL   HM2 (CBC W/O DIFF)   Result Value Ref Range    WBC 16.5 (H) 4.0 - 11.0 thou/uL    RBC 2.88 (L) 3.80 - 5.40 mill/uL    Hemoglobin 9.4 (L) 12.0 - 16.0 g/dL    Hematocrit 26.6 (L) 35.0 - 47.0 %    MCV 92 80 - 100 fL    MCH 32.6 27.0 - 34.0 pg    MCHC 35.3 32.0 - 36.0 g/dL    RDW 13.1 11.0 - 14.5 %    Platelets 157 140 - 440 thou/uL    MPV 9.0 8.5 - 12.5 fL   Sedimentation Rate   Result Value Ref Range    Sed Rate 115 (H) 0 - 20 mm/hr   Protein, Body Fluid   Result Value Ref Range    Protein, Fluid 7.1 g/dL   Glucose, Body Fluid   Result Value Ref Range    Glucose, Fluid 10 mg/dL   Cell Ct/Diff, Body Fluid   Result Value Ref Range    Color, Fluid Yellow     Appearance, Fluid Turbid     WBC, Fluid 774634 (H) 0 - 99 /uL    RBC, Fluid >50,000 (!) <50,000 /ul    Neutrophil % 56 (H) <=25 %    Lymphocyte % 2 <=78 %    Monocyte % 15 <=71 %    Macrophage % 14 <=71 %    Mesothelial %  <=1 %    Eosinophil %  <=1 %    Other Cells % 13 (H) <=1 %   POCT pregnancy, urine   Result Value Ref Range    POC Preg, Urine Negative Negative    POCt Kit Lot Number [id number]     POCT Kit Expiration Date 2019-02-28     Pos Control Valid Control Valid Control    Neg Control Valid Control Valid Control    Dipstick Lot Number [id number]     Dipstick Expiration Date 2018-03-31     POC Specific Gravity, Urine 1.010      Ct Head Without Contrast    Result Date: 8/16/2017  [facility] CT HEAD WO CONTRAST 8/16/2017 6:28 AM INDICATION: Confusion with polyarthropathy TECHNIQUE:  Routine. Dose reduction techniques were used. CONTRAST: None. COMPARISON:  None FINDINGS: No intracranial hemorrhage, extraaxial collection, mass effect or CT evidence of acute infarct.  Normal parenchymal density for age. The ventricles and sulci are normal for age. Osseous structures are intact. The visualized orbits, paranasal sinuses and mastoid air cells are free of significant disease.     CONCLUSION: 1.  Normal head CT. 2.  No CT finding of a mass, infarct or hemorrhage.     Mr Thoracic Spine With Without Contrast    Result Date: 8/16/2017  [facility] MR THORACIC SPINE W WO CONTRAST, MR LUMBAR SPINE W WO CONTRAST 8/16/2017 11:39 AM INDICATION: Urinary retention, leg pain, leukocytosis and elevated inflammatory markers. TECHNIQUE: THORACIC SPINE MRI: Performed without and with IV contrast. LUMBAR SPINE MRI: Performed without and with IV contrast. CONTRAST: 7 mL IV Gadavist. SEDATION: None. COMPARISON: None. FINDINGS: THORACIC SPINE MRI: Normal sagittal alignment of the thoracic spine. Vertebral body heights are maintained. Mildly heterogeneous marrow signal. There is a small lesion involving the posterior T10 vertebral body, which demonstrates T2 prolongation and enhancement. There might be subtle associated T1 shortening at T10. In addition, there is a similar-appearing lesion in the mid L1 vertebral body with similar characteristics. Small atypical hemangioma at T12. Intervertebral disc spaces are maintained. No high-grade spinal canal narrowing. No high-grade right-sided neural foraminal narrowing. No high-grade left-sided neural foraminal narrowing. No abnormal cord signal. No abnormal signal within the intervertebral discs. The paraspinal soft tissues are grossly within normal limits. Small left pleural effusion. There are bibasilar atelectasis. Normal diameter of the descending aorta. LUMBAR SPINE MRI: Nomenclature is based on 5 lumbar type vertebral bodies. The conus ends at L1. There is mild  levocurvature of the lumbar spine. Sagittal alignment is within normal limits. Vertebral body heights are maintained. Heterogeneous marrow signal. There is an ovoid focus of T2 prolongation and enhancement involving the L1 vertebral body. No other areas of abnormal enhancement. No MRI evidence for pars defects. The visualized bony pelvis and proximal femora are grossly within normal limits.  The paraspinal soft tissues are grossly within normal limits. Normal diameter of the descending aorta. T12-L1: Normal disc height and signal. No herniation. No facet arthropathy. No spinal canal stenosis. No right neural foraminal stenosis. No left neural foraminal stenosis. L1-L2: Normal disc height. Loss of the normal T2 signal within the disc. No herniation. Mild bilateral facet arthropathy. No spinal canal stenosis. No right neural foraminal stenosis. No left neural foraminal stenosis. L2-L3: Normal disc height and signal. Shallow broad-based disc bulge. Mild bilateral facet arthropathy. No spinal canal stenosis. No right neural foraminal stenosis. No left neural foraminal stenosis. L3-L4: Normal intervertebral disc height and signal. There is a shallow broad-based disc bulge with superimposed small central/right paracentral disc protrusion. Mild bilateral facet arthropathy. No spinal canal narrowing. No neural foraminal narrowing. L4-L5: Normal disc height and signal. Shallow broad-based disc bulge. Mild bilateral facet arthropathy. No spinal canal stenosis. No right neural foraminal stenosis. No left neural foraminal stenosis. L5-S1: Normal disc height and signal. No herniation. Mild bilateral facet arthropathy. No spinal canal stenosis. No right neural foraminal stenosis. No left neural foraminal stenosis.     CONCLUSION: THORACIC SPINE MRI: 1.  No MRI evidence for discitis/osteomyelitis. 2.  Mild degenerative changes of the thoracic spine. No high-grade spinal canal or neural foraminal narrowing. 3.  The small focus of  T2 prolongation and enhancement at the T10 vertebral body has mild associated T1 shortening and is an atypical hemangioma. LUMBAR SPINE MRI: 1.  No MRI evidence for discitis/osteomyelitis. 2.  Mild degenerative changes of the lumbar spine. No high-grade spinal canal or neural foraminal narrowing. 3.  There is a small focus of T2 prolongation and enhancement at the L1 vertebral body without definite associated T1 shortening. This is indeterminate. It could still represent an atypical hemangioma. Recommend obtaining thin cut CT images at this level  for further evaluation.     Mr Lumbar Spine With Without Contrast    Result Date: 8/16/2017  [facility] MR THORACIC SPINE W WO CONTRAST, MR LUMBAR SPINE W WO CONTRAST 8/16/2017 11:39 AM INDICATION: Urinary retention, leg pain, leukocytosis and elevated inflammatory markers. TECHNIQUE: THORACIC SPINE MRI: Performed without and with IV contrast. LUMBAR SPINE MRI: Performed without and with IV contrast. CONTRAST: 7 mL IV Gadavist. SEDATION: None. COMPARISON: None. FINDINGS: THORACIC SPINE MRI: Normal sagittal alignment of the thoracic spine. Vertebral body heights are maintained. Mildly heterogeneous marrow signal. There is a small lesion involving the posterior T10 vertebral body, which demonstrates T2 prolongation and enhancement. There might be subtle associated T1 shortening at T10. In addition, there is a similar-appearing lesion in the mid L1 vertebral body with similar characteristics. Small atypical hemangioma at T12. Intervertebral disc spaces are maintained. No high-grade spinal canal narrowing. No high-grade right-sided neural foraminal narrowing. No high-grade left-sided neural foraminal narrowing. No abnormal cord signal. No abnormal signal within the intervertebral discs. The paraspinal soft tissues are grossly within normal limits. Small left pleural effusion. There are bibasilar atelectasis. Normal diameter of the descending aorta. LUMBAR SPINE MRI:  Nomenclature is based on 5 lumbar type vertebral bodies. The conus ends at L1. There is mild levocurvature of the lumbar spine. Sagittal alignment is within normal limits. Vertebral body heights are maintained. Heterogeneous marrow signal. There is an ovoid focus of T2 prolongation and enhancement involving the L1 vertebral body. No other areas of abnormal enhancement. No MRI evidence for pars defects. The visualized bony pelvis and proximal femora are grossly within normal limits.  The paraspinal soft tissues are grossly within normal limits. Normal diameter of the descending aorta. T12-L1: Normal disc height and signal. No herniation. No facet arthropathy. No spinal canal stenosis. No right neural foraminal stenosis. No left neural foraminal stenosis. L1-L2: Normal disc height. Loss of the normal T2 signal within the disc. No herniation. Mild bilateral facet arthropathy. No spinal canal stenosis. No right neural foraminal stenosis. No left neural foraminal stenosis. L2-L3: Normal disc height and signal. Shallow broad-based disc bulge. Mild bilateral facet arthropathy. No spinal canal stenosis. No right neural foraminal stenosis. No left neural foraminal stenosis. L3-L4: Normal intervertebral disc height and signal. There is a shallow broad-based disc bulge with superimposed small central/right paracentral disc protrusion. Mild bilateral facet arthropathy. No spinal canal narrowing. No neural foraminal narrowing. L4-L5: Normal disc height and signal. Shallow broad-based disc bulge. Mild bilateral facet arthropathy. No spinal canal stenosis. No right neural foraminal stenosis. No left neural foraminal stenosis. L5-S1: Normal disc height and signal. No herniation. Mild bilateral facet arthropathy. No spinal canal stenosis. No right neural foraminal stenosis. No left neural foraminal stenosis.     CONCLUSION: THORACIC SPINE MRI: 1.  No MRI evidence for discitis/osteomyelitis. 2.  Mild degenerative changes of the  thoracic spine. No high-grade spinal canal or neural foraminal narrowing. 3.  The small focus of T2 prolongation and enhancement at the T10 vertebral body has mild associated T1 shortening and is an atypical hemangioma. LUMBAR SPINE MRI: 1.  No MRI evidence for discitis/osteomyelitis. 2.  Mild degenerative changes of the lumbar spine. No high-grade spinal canal or neural foraminal narrowing. 3.  There is a small focus of T2 prolongation and enhancement at the L1 vertebral body without definite associated T1 shortening. This is indeterminate. It could still represent an atypical hemangioma. Recommend obtaining thin cut CT images at this level  for further evaluation.     Ct Ankle With Contrast Right    Result Date: 8/16/2017  [facility] CT OF THE RIGHT ANKLE with contrast 8/16/2017 6:29 AM INDICATION: Swelling, warmth and tenderness TECHNIQUE: Postcontrast after administration of 100 mL of Omnipaque 350 IV. Axial, sagittal and coronal thin-section reconstruction. Dose reduction techniques were used. COMPARISON: None. FINDINGS: There is soft tissue thickening and reticulation about the ankle, with involvement of the subcutaneous tissues. There is no deep compartment involvement. No drainable fluid collection. There is a tibiotalar joint effusion. No rim-enhancing collections are present. There is no fracture or dislocation. Joint spaces are maintained.     CONCLUSION: 1.  Diffuse soft tissue thickening and edema about the ankle is likely related to inflammation or cellulitis. 2.  Tibiotalar joint effusion. No fracture.     Us Aspiration Or Injection Intermediate    Result Date: 8/16/2017  1. RIGHT ANKLE JOINT ASPIRATION 2. ULTRASOUND GUIDANCE 8/16/2017 9:54 AM INDICATION: Septic joint. PROCEDURE: Procedure and risks explained and consent received. The skin was prepped and draped in sterile fashion. 10 mL 1% lidocaine infused in local soft tissues. Under direct imaging guidance, a 18-gauge needle was  introduced into the joint space. COMPLICATIONS: None. SPECIMEN: 7 mL of thick purulent-appearing fluid was aspirated and sent to lab as ordered by patient's provider. RADIOLOGIC SUPERVISION AND INTERPRETATION: Imaging demonstrates intraarticular needle tip position.     CONCLUSION: Successful image-guided right ankle joint aspiration with 7 mL of purulent appearing fluid aspirated. Findings discussed with patient's ER provider, Dr. Chantel Martinez, on 8/16/2017 at 0945 hours.    EKG: interpreted by myself and shows   Sinus tachycardia  No previous EKG available for comparison    Pertinent Labs/EKG/XRAY Reviewed  Social History, Family History, PMH, PSH, Medications and Allergies reviewed.  Total time: 75 min with >50% time spent with coordination of care and counseling reviewing plan of care with patient , family and nursing staff   8/16/2017  Paulino Starks MD  [facility] Medicine Service  Pager [phone]

## 2021-07-20 NOTE — PROGRESS NOTES
Pt had c/o new back pain. Pt states she woke up and the pain was there. Pt states the pain was different from when she was first admitted. Pt has full sensations to BLE, pt moving extremities well, right leg is more tender with movement. Pt given PRN oxy per pt request, and was asleep 1 hour after medication given. Vitals taken to ensure that VS stable.     Kassie Rojas  6:41 AM  08/19/17

## 2021-07-20 NOTE — PROGRESS NOTES
[facility]      Facility:    [facility] [[id number]]  Code Status: FULL CODE      Chief Complaint/Reason for Visit:  Chief Complaint   Patient presents with     Review Of Multiple Medical Conditions       HPI:   Lauryn is a 46 y.o. female who is at transfer from [facility] on 8/24/17.  She has underlying history of depression and chronic back pain who presented to the hospital for evaluation of bilateral shoulder pain with hip and foot pain and acute confusion.  She apparently presented to her primary care physician 2 weeks prior with muscle skeletal aches and pains and was given symptomatic treatment.  Orthopedics saw this patient and she was diagnosed with tissue swelling and tibiotalar joint effusion which was felt to be from the setting of multiple septic joints and a toxic appearance.  Cultures grew hemolytic strep bacteremia.  So she went undergoing surgery with open irrigation debridement of her right ankle through arthroscopic irrigation and debridement of her left shoulder on 8/16/17.  The exact etiology of her infections remains unclear.  She denies polysubstance abuse.  INDY was negative for vegetations and is currently taking 2 weeks of IV ceftriaxone, will be following up with ID weekly per switching to oral antibiotics.  Interestingly her albumin was 1.8 on presentation with elevated AST, which may indicate synthetic disease.    TCU:  Today I find a 46-year-old female lying in bed with minimal issues.  She states her pain is well controlled through current plan of care.  She also tells me that she does not sleep that well.  So I will start melatonin 3 mg at at bedtime.  Also I asked her about her previous diet her low albumin she states.  She states that she has not eaten very well.  I will order Magic cup daily with a dietitian to consult.  Also noticed that her magnesium is low at 1.4, so I will start magnesium supplementation 250 mg  daily.    Patient denies pain, headache, chest pain, numbness or tingling, shortest of breath, eating or swallowing concerns, nausea or vomiting, diarrhea or bowel abnormalities, or no new integumentary concerns today.  ID following weekly.      Past Medical History:  Past Medical History:   Diagnosis Date     Anxiety      Depression      Septic arthritis            Surgical History:  Past Surgical History:   Procedure Laterality Date     PICC  8/18/2017            Family History:   No family history on file.    Social History:    Social History     Social History     Marital status:      Spouse name: N/A     Number of children: N/A     Years of education: N/A     Social History Main Topics     Smoking status: Never Smoker     Smokeless tobacco: Never Used     Alcohol use Yes      Comment: occasional     Drug use: No     Sexual activity: Not on file     Other Topics Concern     Not on file     Social History Narrative          Review of Systems   Constitutional: Positive for activity change, appetite change and fatigue. Negative for diaphoresis.        No acute concerns   HENT: Negative for congestion, facial swelling and hearing loss.    Eyes: Negative for photophobia and visual disturbance.   Respiratory: Negative for apnea, shortness of breath and wheezing.    Cardiovascular: Negative for chest pain.   Endocrine: Negative.    Genitourinary: Negative for dysuria and frequency.   Musculoskeletal: Negative for back pain and joint swelling.        Painful ambulation   Skin:        Dressing clean dry intact   Allergic/Immunologic: Negative.    Neurological: Negative for dizziness, seizures, syncope, weakness and headaches.   Hematological: Negative.    Psychiatric/Behavioral: Positive for confusion. Negative for agitation, decreased concentration and hallucinations. The patient is not nervous/anxious.         Periodic confusion per nursing       Vitals:    08/25/17 1113   BP: 132/81   Pulse: 69   Resp: 18    Temp: 97.8  F (36.6  C)   SpO2: 98%   Weight: 130 lb 6.4 oz (59.1 kg)       Physical Exam   Constitutional: She is oriented to person, place, and time. She appears well-developed. No distress.   No acute concerns   HENT:   Head: Normocephalic and atraumatic.   Mouth/Throat: Oropharynx is clear and moist.   Eyes: Pupils are equal, round, and reactive to light. Right eye exhibits no discharge. Left eye exhibits no discharge.   Neck: Normal range of motion. Neck supple.   Cardiovascular: Normal rate, regular rhythm and normal heart sounds.  Exam reveals no gallop.    No murmur heard.  Pulmonary/Chest: Effort normal and breath sounds normal. She has no wheezes. She has no rales.   CTA   Abdominal: Soft. Bowel sounds are normal. She exhibits no distension. There is no tenderness.   No diarrhea or constipation   Genitourinary:   Genitourinary Comments: Deferred   Musculoskeletal:   Painful ambulation on the right ankle   Neurological: She is alert and oriented to person, place, and time.   Seems to be baseline   Skin: Skin is warm and dry. She is not diaphoretic.   Right ankle and left shoulder dressing clean dry and intact   Psychiatric:   Has depression without anxiety   Nursing note and vitals reviewed.      Medication List:  Current Outpatient Prescriptions   Medication Sig     magnesium oxide 250 mg Tab Take by mouth daily.     melatonin 3 mg Tab tablet Take 3 mg by mouth at bedtime.     acetaminophen (TYLENOL) 325 MG tablet Take 2 tablets (650 mg total) by mouth every 6 (six) hours as needed.     aspirin 325 MG tablet Take 1 tablet (325 mg total) by mouth daily.     busPIRone (BUSPAR) 10 MG tablet Take 20 mg by mouth 2 (two) times a day.     [START ON 9/1/2017] cefdinir (OMNICEF) 300 MG capsule Take 1 capsule (300 mg total) by mouth 2 (two) times a day for 14 days. Start after finish IV antibiotics     cefTRIAXone 2 g in NaCl 0.9 % 0.9 % 50 mL IVPB Infuse 2 g into a venous catheter daily for 14 days.     ferrous  sulfate (FERROUSUL) 325 (65 FE) MG tablet Take 1 tablet (325 mg total) by mouth daily with breakfast.     FLUoxetine (PROZAC) 40 MG capsule Take 40 mg by mouth daily.     gabapentin (NEURONTIN) 100 MG capsule Take 100-200 mg by mouth at bedtime as needed (for nerve pain).     ibuprofen (ADVIL,MOTRIN) 200 MG tablet Take 400 mg by mouth every 6 (six) hours as needed for pain.     omeprazole (PRILOSEC) 20 MG capsule Take 1 capsule (20 mg total) by mouth daily before breakfast.     oxyCODONE (ROXICODONE) 5 MG immediate release tablet Take 1-2 tablets (5-10 mg total) by mouth every 4 (four) hours as needed.     rizatriptan (MAXALT) 10 MG tablet Take 10 mg by mouth as needed for migraine (may repeat x1 in 2 hours, maximum of 3 tablets/24 hours). May repeat in 2 hours if needed     senna-docusate (PERICOLACE) 8.6-50 mg tablet Take 1 tablet by mouth 2 (two) times a day.       Labs:  Recent Results (from the past 168 hour(s))   Potassium   Result Value Ref Range    Potassium 3.7 3.5 - 5.0 mmol/L   Comprehensive Metabolic Panel   Result Value Ref Range    Sodium 130 (L) 136 - 145 mmol/L    Potassium 4.0 3.5 - 5.0 mmol/L    Chloride 107 98 - 107 mmol/L    CO2 24 22 - 31 mmol/L    Anion Gap, Calculation <0 (L) 5 - 18 mmol/L    Glucose 92 70 - 125 mg/dL    BUN 7 (L) 8 - 22 mg/dL    Creatinine 0.63 0.60 - 1.10 mg/dL    GFR MDRD Af Amer >60 >60 mL/min/1.73m2    GFR MDRD Non Af Amer >60 >60 mL/min/1.73m2    Bilirubin, Total 0.7 0.0 - 1.0 mg/dL    Calcium 7.7 (L) 8.5 - 10.5 mg/dL    Protein, Total 9.7 (H) 6.0 - 8.0 g/dL    Albumin 1.2 (L) 3.5 - 5.0 g/dL    Alkaline Phosphatase 55 45 - 120 U/L    AST 53 (H) 0 - 40 U/L    ALT 57 (H) 0 - 45 U/L   HM1 (CBC with Diff)   Result Value Ref Range    WBC 9.6 4.0 - 11.0 thou/uL    RBC 2.10 (L) 3.80 - 5.40 mill/uL    Hemoglobin 6.9 (LL) 12.0 - 16.0 g/dL    Hematocrit 20.0 (L) 35.0 - 47.0 %    MCV 95 80 - 100 fL    MCH 32.9 27.0 - 34.0 pg    MCHC 34.5 32.0 - 36.0 g/dL    RDW 13.8 11.0 - 14.5 %     Platelets 103 (L) 140 - 440 thou/uL    MPV 9.6 8.5 - 12.5 fL   Type and Screen   Result Value Ref Range    ABORh A POS     Antibody Screen Negative Negative   Manual Differential   Result Value Ref Range    Total Neutrophils % 80 (H) 50 - 70 %    Lymphocytes % 14 (L) 20 - 40 %    Monocytes % 4 2 - 10 %    Eosinophils %  0 0 - 6 %    Basophils % 0 0 - 2 %    Metamyelocytes % 1 <=1 %    Myelocytes % 1 <=1 %    Total Neutrophils Absolute 7.7 2.0 - 7.7 thou/ul    Lymphocytes Absolute 1.3 0.8 - 4.4 thou/uL    Monocytes Absolute 0.4 0.0 - 0.9 thou/uL    Eosinophils Absolute 0.0 0.0 - 0.4 thou/uL    Basophils Absolute 0.0 0.0 - 0.2 thou/uL    Metamyelocytes Absolute 0.1 <=0.1 thou/uL    Myelocytes Absolute 0.1 <=0.1 thou/uL    Platelet Estimate Decreased (!) Normal   Crossmatch   Result Value Ref Range    Crossmatch Compatible     Blood Expiration Date [id number]     Unit Type A Pos     Unit Number [id number]     Status Transfused     Component Red Blood Cells     PRODUCT CODE [id number]     Issue Date and Time [id number]     Blood Type 6200     CODING SYSTEM [id number]    Magnesium   Result Value Ref Range    Magnesium 1.4 (L) 1.8 - 2.6 mg/dL   Basic Metabolic Panel   Result Value Ref Range    Sodium 132 (L) 136 - 145 mmol/L    Potassium 4.1 3.5 - 5.0 mmol/L    Chloride 103 98 - 107 mmol/L    CO2 30 22 - 31 mmol/L    Anion Gap, Calculation <0 (L) 5 - 18 mmol/L    Glucose 90 70 - 125 mg/dL    Calcium 8.0 (L) 8.5 - 10.5 mg/dL    BUN 8 8 - 22 mg/dL    Creatinine 0.67 0.60 - 1.10 mg/dL    GFR MDRD Af Amer >60 >60 mL/min/1.73m2    GFR MDRD Non Af Amer >60 >60 mL/min/1.73m2   Prealbumin   Result Value Ref Range    Prealbumin 10.4 (L) 19.0 - 38.0 mg/dL   HM1 (CBC with Diff)   Result Value Ref Range    WBC 9.4 4.0 - 11.0 thou/uL    RBC 2.43 (L) 3.80 - 5.40 mill/uL    Hemoglobin 7.8 (L) 12.0 - 16.0 g/dL    Hematocrit 23.1 (L) 35.0 - 47.0 %    MCV 95 80 - 100 fL    MCH 32.1 27.0 - 34.0 pg    MCHC 33.8 32.0 - 36.0 g/dL     RDW 13.8 11.0 - 14.5 %    Platelets 107 (L) 140 - 440 thou/uL    MPV 9.8 8.5 - 12.5 fL   Manual Differential   Result Value Ref Range    Total Neutrophils % 71 (H) 50 - 70 %    Lymphocytes % 17 (L) 20 - 40 %    Monocytes % 3 2 - 10 %    Eosinophils %  2 0 - 6 %    Basophils % 0 0 - 2 %    Metamyelocytes % 3 (H) <=1 %    Myelocytes % 5 (H) <=1 %    Promyelocytes % 1 (H) <=0 %    Total Neutrophils Absolute 6.7 2.0 - 7.7 thou/ul    Lymphocytes Absolute 1.6 0.8 - 4.4 thou/uL    Monocytes Absolute 0.3 0.0 - 0.9 thou/uL    Eosinophils Absolute 0.1 0.0 - 0.4 thou/uL    Basophils Absolute 0.0 0.0 - 0.2 thou/uL    Metamyelocytes Absolute 0.2 (H) <=0.1 thou/uL    Myelocytes Absolute 0.5 (H) <=0.1 thou/uL    Promyelocytes Absolute 0.0 <=0.1 thou/uL    Reactive Lymphocytes 1+ (!) Negative    Platelet Estimate Decreased (!) Normal         Assessment:    ICD-10-CM    1. Hypomagnesemia E83.42    2. Malnutrition E46    3. Adjustment insomnia F51.02    4. Weakness R53.1        Plan:  1. Start magnesium 250mg daily, check mag in 1 week  2.  Albumin 1.2 and pre-albumin 10.4, consult dietitian, start Magic cup  3.  Start melatonin 3 mg at bedtime  4.  Continue therapy    The care plan has been reviewed and all orders signed. Changes to care plan, if any, as noted. Otherwise, continue care plan of care.      Electronically signed by: Edenilson Lopez NP

## 2021-07-20 NOTE — PROGRESS NOTES
[facility]      Code Status:  FULL CODE  Visit Type: H & P     Facility:  [facility] [[id number]]           History of Present Illness: Lauryn Raya is a 46 y.o. female admitted to the TCU as a transfer from the hospital and was examined in the presence of both her parents.  This is a previously healthy 46-year-old with underlying history of depression or chronic back pain who presented to the hospital for evaluation of bilateral shoulder pain with hip and foot pain she was also noted to be very confused at the time of her presentation.  Had presented to her primary care physician for similar complaints about 2 weeks ago prior to this hospitalization and was felt to have musculoskeletal aches and pains and given symptomatic treatment.  Orthopedics saw this patient, a CT of the right ankle showed no fractures or dislocation but soft tissue swelling and tibiotalar joint effusion and it was felt with the setting of multiple septic joints and a toxic appearance and abnormal labs she should have surgery so she underwent open irrigation and debridement of her right ankle with arthroscopic irrigation and debridement of her left shoulder done on 8/16/17  The exact etiology of her infections remains unclear she drank denies any IV drug abuse also and her cultures grew alpha hemolytic strep bacteremia with septic arthritis right ankle a transesophageal echocardiogram was negative for any vegetations and she is currently on 2 weeks of IV ceftriaxone and subsequently she will switch to oral antibiotics with outpatient follow-up with infectious disease  Debility she seems to be slowly coming back to baseline however her current cognitive testing score was 20/30    Past Medical History:   Diagnosis Date     Anxiety      Depression      Septic arthritis      Past Surgical History:   Procedure Laterality Date     PICC  8/18/2017          No family history on file.  Social History      Social History     Marital status:      Spouse name: N/A     Number of children: N/A     Years of education: N/A     Occupational History     Not on file.     Social History Main Topics     Smoking status: Never Smoker     Smokeless tobacco: Never Used     Alcohol use Yes      Comment: occasional     Drug use: No     Sexual activity: Not on file     Other Topics Concern     Not on file     Social History Narrative     Current Outpatient Prescriptions   Medication Sig Dispense Refill     acetaminophen (TYLENOL) 325 MG tablet Take 2 tablets (650 mg total) by mouth every 6 (six) hours as needed. 100 tablet 0     aspirin 325 MG tablet Take 1 tablet (325 mg total) by mouth daily. 30 tablet 0     busPIRone (BUSPAR) 10 MG tablet Take 20 mg by mouth 2 (two) times a day.       [START ON 9/1/2017] cefdinir (OMNICEF) 300 MG capsule Take 1 capsule (300 mg total) by mouth 2 (two) times a day for 14 days. Start after finish IV antibiotics 28 capsule 0     cefTRIAXone 2 g in NaCl 0.9 % 0.9 % 50 mL IVPB Infuse 2 g into a venous catheter daily for 14 days. 1 each 0     ferrous sulfate (FERROUSUL) 325 (65 FE) MG tablet Take 1 tablet (325 mg total) by mouth daily with breakfast.  0     FLUoxetine (PROZAC) 40 MG capsule Take 40 mg by mouth daily.       gabapentin (NEURONTIN) 100 MG capsule Take 100-200 mg by mouth at bedtime as needed (for nerve pain).       ibuprofen (ADVIL,MOTRIN) 200 MG tablet Take 400 mg by mouth every 6 (six) hours as needed for pain.       omeprazole (PRILOSEC) 20 MG capsule Take 1 capsule (20 mg total) by mouth daily before breakfast.  0     oxyCODONE (ROXICODONE) 5 MG immediate release tablet Take 1-2 tablets (5-10 mg total) by mouth every 4 (four) hours as needed. 60 tablet 0     rizatriptan (MAXALT) 10 MG tablet Take 10 mg by mouth as needed for migraine (may repeat x1 in 2 hours, maximum of 3 tablets/24 hours). May repeat in 2 hours if needed       senna-docusate (PERICOLACE) 8.6-50 mg tablet  Take 1 tablet by mouth 2 (two) times a day. 60 tablet 0     No current facility-administered medications for this visit.      No Known Allergies      Review of Systems:    Constitutional: Negative.  Negative for fever, chills, has activity change, appetite change and fatigue.   HENT: Negative for congestion and facial swelling.    Eyes: Negative for photophobia, redness and visual disturbance.   Respiratory: Negative for cough and chest tightness.    Cardiovascular: Negative for chest pain, palpitations and leg swelling.   Gastrointestinal: Negative for nausea, diarrhea, constipation, blood in stool and abdominal distention.   Genitourinary: Negative.    Musculoskeletal: Negative.  Has some pain in her right ankle and left shoulder  Skin: Negative.    Neurological: Negative for dizziness, tremors, syncope, weakness, light-headedness and headaches.   Hematological: Does not bruise/bleed easily.   Psychiatric/Behavioral: Negative.    Her mood is stable    Vitals:    08/21/17 1239   BP: 152/89   Pulse: 77   Temp: 97  F (36.1  C)       Physical Exam:    GENERAL: no acute distress. Cooperative in conversation.   HEENT: pupils are equal, round and reactive. Oral mucosa is moist and intact.  RESP:Chest symmetric. Regular respiratory rate. No stridor.  CVS: S1S2  ABD: Nondistended, soft.  EXTREMITIES: No lower extremity edema.  Right ankle incision is intact it is painful and tender but no evidence of any a time or drainage from her incision site  Left shoulder incision was healing well also  NEURO: non focal. Alert and oriented x3.   PSYCH: within normal limits. No depression or anxiety.  SKIN: warm dry intact     Labs:    Recent Results (from the past 240 hour(s))   Basic Metabolic Panel   Result Value Ref Range    Sodium 120 (L) 136 - 145 mmol/L    Potassium 3.3 (L) 3.5 - 5.0 mmol/L    Chloride 93 (L) 98 - 107 mmol/L    CO2 24 22 - 31 mmol/L    Anion Gap, Calculation 3 (L) 5 - 18 mmol/L    Glucose 136 (H) 70 - 125 mg/dL     Calcium 11.1 (H) 8.5 - 10.5 mg/dL    BUN 15 8 - 22 mg/dL    Creatinine 0.86 0.60 - 1.10 mg/dL    GFR MDRD Af Amer >60 >60 mL/min/1.73m2    GFR MDRD Non Af Amer >60 >60 mL/min/1.73m2   Hepatic Profile   Result Value Ref Range    Bilirubin, Total 1.0 0.0 - 1.0 mg/dL    Bilirubin, Direct 0.4 <=0.5 mg/dL    Protein, Total 12.3 (H) 6.0 - 8.0 g/dL    Albumin 1.8 (L) 3.5 - 5.0 g/dL    Alkaline Phosphatase 47 45 - 120 U/L    AST 45 (H) 0 - 40 U/L    ALT 27 0 - 45 U/L   Lactic Acid   Result Value Ref Range    Lactic Acid 1.2 0.5 - 2.2 mmol/L   CK   Result Value Ref Range    CK, Total 359 (H) 30 - 190 U/L   C-Reactive Protein   Result Value Ref Range    CRP 20.9 (H) 0.0 - 0.8 mg/dL   HM2 (CBC W/O DIFF)   Result Value Ref Range    WBC 16.5 (H) 4.0 - 11.0 thou/uL    RBC 2.88 (L) 3.80 - 5.40 mill/uL    Hemoglobin 9.4 (L) 12.0 - 16.0 g/dL    Hematocrit 26.6 (L) 35.0 - 47.0 %    MCV 92 80 - 100 fL    MCH 32.6 27.0 - 34.0 pg    MCHC 35.3 32.0 - 36.0 g/dL    RDW 13.1 11.0 - 14.5 %    Platelets 157 140 - 440 thou/uL    MPV 9.0 8.5 - 12.5 fL   Sedimentation Rate   Result Value Ref Range    Sed Rate 115 (H) 0 - 20 mm/hr   Blood Culture 2nd   Result Value Ref Range    Anaerobic Blood Culture Bottle Positive after less than 24 hours incubation (!) No Growth, No organisms seen, bottle returned to instrument, Specimen not received    Aerobic Blood Culture Bottle Positive after less than 24 hours incubation (!) No Growth, No organisms seen, bottle returned to instrument, Specimen not received   Culture, Blood Positive Work-up   Result Value Ref Range    Culture Streptococcus pneumoniae (!)     Gram Stain Result Gram positive cocci        Susceptibility    Streptococcus pneumoniae - ABIGAIL     Ceftriaxone 0.5 Sensitive      Ceftriaxone (NonMeningitis)  Sensitive      Ceftriaxone (Meningitis)  Sensitive      Penicillin 2 Resistant      Penicillin G (NonMeningitis)  Sensitive      Penicillin g (Meningitis)  Resistant      Penicillin G (Oral)   Resistant    Blood Culture 1st   Result Value Ref Range    Anaerobic Blood Culture Bottle Positive after less than 24 hours incubation (!) No Growth, No organisms seen, bottle returned to instrument, Specimen not received    Aerobic Blood Culture Bottle Positive after less than 24 hours incubation (!) No Growth, No organisms seen, bottle returned to instrument, Specimen not received   Culture, Blood Positive Work-up   Result Value Ref Range    Culture Streptococcus pneumoniae (!)     Gram Stain Result Gram positive cocci    ECG 12 lead nursing unit performed   Result Value Ref Range    SYSTOLIC BLOOD PRESSURE  mmHg    DIASTOLIC BLOOD PRESSURE  mmHg    VENTRICULAR RATE 102 BPM    ATRIAL RATE 102 BPM    P-R INTERVAL 158 ms    QRS DURATION 94 ms    Q-T INTERVAL 310 ms    QTC CALCULATION (BEZET) 404 ms    P Axis 58 degrees    R AXIS 58 degrees    T AXIS 54 degrees    MUSE DIAGNOSIS       Sinus tachycardia  Otherwise normal ECG  No previous ECGs available  Confirmed by TEX DURON MD LOC: ([id number]) on 8/16/2017 2:52:37 PM     Chlamydia trachomatis & Neisseria gonorrhoeae, Amplified Detection   Result Value Ref Range    Chlamydia trachomatis, Amplified Detection Negative Negative    Neisseria gonorrhoeae, Amplified Detection Negative Negative   POCT pregnancy, urine   Result Value Ref Range    POC Preg, Urine Negative Negative    POCt Kit Lot Number [id number]     POCT Kit Expiration Date 2019-02-28     Pos Control Valid Control Valid Control    Neg Control Valid Control Valid Control    Dipstick Lot Number [id number]     Dipstick Expiration Date 2018-03-31     POC Specific Gravity, Urine 1.010    Protein, Body Fluid   Result Value Ref Range    Protein, Fluid 7.1 g/dL   Glucose, Body Fluid   Result Value Ref Range    Glucose, Fluid 10 mg/dL   Culture and Gram Stain, Drainage   Result Value Ref Range    Culture 4+ Streptococcus pneumoniae (!)     Gram Stain Result 4+ Polymorphonuclear leukocytes     Gram Stain Result 4+ Gram  positive cocci in pairs     Gram Stain Result 2+ Gram positive cocci in chains        Susceptibility    Streptococcus pneumoniae - ABIGAIL     Ceftriaxone 0.5 Sensitive      Ceftriaxone (NonMeningitis)  Sensitive      Ceftriaxone (Meningitis)  Sensitive      Penicillin 2 Resistant      Penicillin G (NonMeningitis)  Sensitive      Penicillin g (Meningitis)  Resistant      Penicillin G (Oral)  Resistant    Cell Ct/Diff, Body Fluid   Result Value Ref Range    Color, Fluid Yellow     Appearance, Fluid Turbid     WBC, Fluid 050494 (H) 0 - 99 /uL    RBC, Fluid >50,000 (!) <50,000 /ul    Neutrophil % 56 (H) <=25 %    Lymphocyte % 2 <=78 %    Monocyte % 15 <=71 %    Macrophage % 14 <=71 %    Mesothelial %  <=1 %    Eosinophil %  <=1 %    Other Cells % 13 (H) <=1 %   Culture/Gram Stain: Body Fluid   Result Value Ref Range    Culture 1+ Streptococcus pneumoniae (!)     Gram Stain Result No polymorphonuclear leukocytes seen     Gram Stain Result No organisms seen     Gram Stain Result       Results reviewed and amended by microbiology staff     Gram Stain Result See corrected result below     Gram Stain Result 4+ Polymorphonuclear leukocytes     Gram Stain Result No organisms seen    Protein, Body Fluid   Result Value Ref Range    Protein, Fluid <0.8 g/dL   C-reactive protein   Result Value Ref Range    CRP 19.5 (H) 0.0 - 0.8 mg/dL   Thyroid Cascade   Result Value Ref Range    TSH 2.36 0.30 - 5.00 uIU/mL   Vitamin B12   Result Value Ref Range    Vitamin B-12 504 213 - 816 pg/mL   Folate, Serum   Result Value Ref Range    Folate 6.5 >=3.5 ng/mL   HIV Antigen/Antibody Screening Cascade   Result Value Ref Range    HIV Antigen / Antibody Negative Negative   Comprehensive metabolic panel   Result Value Ref Range    Sodium 129 (L) 136 - 145 mmol/L    Potassium 3.9 3.5 - 5.0 mmol/L    Chloride 103 98 - 107 mmol/L    CO2 25 22 - 31 mmol/L    Anion Gap, Calculation 1 (L) 5 - 18 mmol/L    Glucose 127 (H) 70 - 125 mg/dL    BUN 14 8 - 22  mg/dL    Creatinine 0.76 0.60 - 1.10 mg/dL    GFR MDRD Af Amer >60 >60 mL/min/1.73m2    GFR MDRD Non Af Amer >60 >60 mL/min/1.73m2    Bilirubin, Total 0.5 0.0 - 1.0 mg/dL    Calcium 9.6 8.5 - 10.5 mg/dL    Protein, Total 9.8 (H) 6.0 - 8.0 g/dL    Albumin 1.3 (L) 3.5 - 5.0 g/dL    Alkaline Phosphatase 42 (L) 45 - 120 U/L    AST 31 0 - 40 U/L    ALT 26 0 - 45 U/L   C-Reactive Protein(CRP)   Result Value Ref Range    CRP 19.4 (H) 0.0 - 0.8 mg/dL   CK Total   Result Value Ref Range    CK, Total 56 30 - 190 U/L   Magnesium   Result Value Ref Range    Magnesium 1.1 (L) 1.8 - 2.6 mg/dL   HM1 (CBC with Diff)   Result Value Ref Range    WBC 9.0 4.0 - 11.0 thou/uL    RBC 2.30 (L) 3.80 - 5.40 mill/uL    Hemoglobin 7.5 (L) 12.0 - 16.0 g/dL    Hematocrit 21.9 (L) 35.0 - 47.0 %    MCV 95 80 - 100 fL    MCH 32.6 27.0 - 34.0 pg    MCHC 34.2 32.0 - 36.0 g/dL    RDW 13.7 11.0 - 14.5 %    Platelets 105 (L) 140 - 440 thou/uL    MPV 9.2 8.5 - 12.5 fL   Manual Differential   Result Value Ref Range    Total Neutrophils % 86 (H) 50 - 70 %    Lymphocytes % 11 (L) 20 - 40 %    Monocytes % 2 2 - 10 %    Eosinophils %  0 0 - 6 %    Basophils % 0 0 - 2 %    Metamyelocytes % 0 <=1 %    Myelocytes % 1 <=1 %    Total Neutrophils Absolute 7.7 2.0 - 7.7 thou/ul    Lymphocytes Absolute 1.0 0.8 - 4.4 thou/uL    Monocytes Absolute 0.1 0.0 - 0.9 thou/uL    Eosinophils Absolute 0.0 0.0 - 0.4 thou/uL    Basophils Absolute 0.0 0.0 - 0.2 thou/uL    Metamyelocytes Absolute 0.0 <=0.1 thou/uL    Myelocytes Absolute 0.1 <=0.1 thou/uL    Platelet Estimate Decreased (!) Normal    Ovalocytes 1+ (!) Negative    Basophilic Stippling 1+ (!) Negative   Iron and Transferrin Iron Binding Capacity   Result Value Ref Range    Iron 20 (L) 42 - 175 ug/dL    Transferrin 68 (L) 212 - 360 mg/dL    Transferrin Saturation, Calculated 24 20 - 50 %    Transferrin IBC, Calculated 85 (L) 313 - 563 ug/dL   Echo Transesophageal   Result Value Ref Range    BSA 1.67 m2    Hieght 66 in     Weight 2128 lbs    /64 mmHg    HR 75 bpm    Height 66.0 in    Weight 133 lbs   HM2(CBC W/O DIFF)   Result Value Ref Range    WBC 8.9 4.0 - 11.0 thou/uL    RBC 2.25 (L) 3.80 - 5.40 mill/uL    Hemoglobin 7.3 (L) 12.0 - 16.0 g/dL    Hematocrit 21.8 (L) 35.0 - 47.0 %    MCV 97 80 - 100 fL    MCH 32.4 27.0 - 34.0 pg    MCHC 33.5 32.0 - 36.0 g/dL    RDW 13.7 11.0 - 14.5 %    Platelets 103 (L) 140 - 440 thou/uL    MPV 9.5 8.5 - 12.5 fL   Urinalysis-UC if Indicated   Result Value Ref Range    Color, UA Yellow Colorless, Yellow, Straw, Light Yellow    Clarity, UA Cloudy (!) Clear    Glucose, UA Negative Negative    Bilirubin, UA Negative Negative    Ketones, UA Negative Negative, 60 mg/dL    Specific Gravity, UA 1.026 1.001 - 1.030    Blood, UA Large (!) Negative    pH, UA 6.0 4.5 - 8.0    Protein, UA 30 mg/dL (!) Negative mg/dL    Urobilinogen, UA 2.0 E.U./dL <2.0 E.U./dL, 2.0 E.U./dL    Nitrite, UA Negative Negative    Leukocytes, UA Trace (!) Negative    Bacteria, UA Few (!) None Seen hpf    RBC, UA  (!) None Seen, 0-2 hpf    WBC, UA 5-10 (!) None Seen, 0-5 hpf    Squam Epithel, UA 5-10 (!) None Seen, 0-5 lpf    Renal Epithel, UA 0-5 (!) None Seen lpf    Yeast, UA Few (!) None Seen hpf    Amorphous, UA Moderate (!) None Seen    Mucus, UA Few (!) None Seen lpf    Granular Casts, UA 10-25 (!) None Seen lpf    Hyaline Casts, UA 25-50 (!) 0-5, None Seen lpf   Drug Abuse 1, Urine   Result Value Ref Range    Amphetamines Screen Negative Screen Negative    Benzodiazepines (!) Screen Negative     Screen Positive (Confirmation available on request)    Opiates (!) Screen Negative     Screen Positive (Confirmation available on request)    Phencyclidine Screen Negative Screen Negative    THC Screen Negative Screen Negative    Barbiturates Screen Negative Screen Negative    Cocaine Metabolite Screen Negative Screen Negative    Oxycodone (!) Screen Negative     Screen Positive (Confirmation available on request)     Creatinine, Urine 98.2 mg/dL   Pregnancy, urine   Result Value Ref Range    Pregnancy Test, Urine Negative Negative    Specific Gravity, UA 1.026 1.001 - 1.030   Culture, Urine   Result Value Ref Range    Culture No Growth    Vancomycin (Vancocin )   Result Value Ref Range    Vancomycin 16.4 <=25.0 ug/mL   Magnesium   Result Value Ref Range    Magnesium 1.6 (L) 1.8 - 2.6 mg/dL   Comprehensive Metabolic Panel   Result Value Ref Range    Sodium 130 (L) 136 - 145 mmol/L    Potassium 3.5 3.5 - 5.0 mmol/L    Chloride 105 98 - 107 mmol/L    CO2 26 22 - 31 mmol/L    Anion Gap, Calculation <0 (L) 5 - 18 mmol/L    Glucose 116 70 - 125 mg/dL    BUN 12 8 - 22 mg/dL    Creatinine 0.66 0.60 - 1.10 mg/dL    GFR MDRD Af Amer >60 >60 mL/min/1.73m2    GFR MDRD Non Af Amer >60 >60 mL/min/1.73m2    Bilirubin, Total 0.7 0.0 - 1.0 mg/dL    Calcium 7.9 (L) 8.5 - 10.5 mg/dL    Protein, Total 9.7 (H) 6.0 - 8.0 g/dL    Albumin 1.2 (L) 3.5 - 5.0 g/dL    Alkaline Phosphatase 48 45 - 120 U/L    AST 72 (H) 0 - 40 U/L    ALT 54 (H) 0 - 45 U/L   C-Reactive Protein   Result Value Ref Range    CRP 12.0 (H) 0.0 - 0.8 mg/dL   HM1 (CBC with Diff)   Result Value Ref Range    WBC 8.0 4.0 - 11.0 thou/uL    RBC 2.19 (L) 3.80 - 5.40 mill/uL    Hemoglobin 7.2 (L) 12.0 - 16.0 g/dL    Hematocrit 21.4 (L) 35.0 - 47.0 %    MCV 98 80 - 100 fL    MCH 32.9 27.0 - 34.0 pg    MCHC 33.6 32.0 - 36.0 g/dL    RDW 13.9 11.0 - 14.5 %    Platelets 116 (L) 140 - 440 thou/uL    MPV 9.5 8.5 - 12.5 fL   Manual Differential   Result Value Ref Range    Total Neutrophils % 80 (H) 50 - 70 %    Lymphocytes % 13 (L) 20 - 40 %    Monocytes % 1 (L) 2 - 10 %    Eosinophils %  3 0 - 6 %    Basophils % 0 0 - 2 %    Metamyelocytes % 3 (H) <=1 %    Total Neutrophils Absolute 6.4 2.0 - 7.7 thou/ul    Lymphocytes Absolute 1.0 0.8 - 4.4 thou/uL    Monocytes Absolute 0.1 0.0 - 0.9 thou/uL    Eosinophils Absolute 0.2 0.0 - 0.4 thou/uL    Basophils Absolute 0.0 0.0 - 0.2 thou/uL     Metamyelocytes Absolute 0.2 (H) <=0.1 thou/uL    Platelet Estimate Decreased (!) Normal   Occult Blood, Fecal   Result Value Ref Range    Occult Blood, Stool #1 Negative Negative   Potassium   Result Value Ref Range    Potassium 3.7 3.5 - 5.0 mmol/L   Comprehensive Metabolic Panel   Result Value Ref Range    Sodium 130 (L) 136 - 145 mmol/L    Potassium 4.0 3.5 - 5.0 mmol/L    Chloride 107 98 - 107 mmol/L    CO2 24 22 - 31 mmol/L    Anion Gap, Calculation <0 (L) 5 - 18 mmol/L    Glucose 92 70 - 125 mg/dL    BUN 7 (L) 8 - 22 mg/dL    Creatinine 0.63 0.60 - 1.10 mg/dL    GFR MDRD Af Amer >60 >60 mL/min/1.73m2    GFR MDRD Non Af Amer >60 >60 mL/min/1.73m2    Bilirubin, Total 0.7 0.0 - 1.0 mg/dL    Calcium 7.7 (L) 8.5 - 10.5 mg/dL    Protein, Total 9.7 (H) 6.0 - 8.0 g/dL    Albumin 1.2 (L) 3.5 - 5.0 g/dL    Alkaline Phosphatase 55 45 - 120 U/L    AST 53 (H) 0 - 40 U/L    ALT 57 (H) 0 - 45 U/L   HM1 (CBC with Diff)   Result Value Ref Range    WBC 9.6 4.0 - 11.0 thou/uL    RBC 2.10 (L) 3.80 - 5.40 mill/uL    Hemoglobin 6.9 (LL) 12.0 - 16.0 g/dL    Hematocrit 20.0 (L) 35.0 - 47.0 %    MCV 95 80 - 100 fL    MCH 32.9 27.0 - 34.0 pg    MCHC 34.5 32.0 - 36.0 g/dL    RDW 13.8 11.0 - 14.5 %    Platelets 103 (L) 140 - 440 thou/uL    MPV 9.6 8.5 - 12.5 fL   Type and Screen   Result Value Ref Range    ABORh A POS     Antibody Screen Negative Negative   Manual Differential   Result Value Ref Range    Total Neutrophils % 80 (H) 50 - 70 %    Lymphocytes % 14 (L) 20 - 40 %    Monocytes % 4 2 - 10 %    Eosinophils %  0 0 - 6 %    Basophils % 0 0 - 2 %    Metamyelocytes % 1 <=1 %    Myelocytes % 1 <=1 %    Total Neutrophils Absolute 7.7 2.0 - 7.7 thou/ul    Lymphocytes Absolute 1.3 0.8 - 4.4 thou/uL    Monocytes Absolute 0.4 0.0 - 0.9 thou/uL    Eosinophils Absolute 0.0 0.0 - 0.4 thou/uL    Basophils Absolute 0.0 0.0 - 0.2 thou/uL    Metamyelocytes Absolute 0.1 <=0.1 thou/uL    Myelocytes Absolute 0.1 <=0.1 thou/uL    Platelet Estimate  Decreased (!) Normal   Crossmatch   Result Value Ref Range    Crossmatch Compatible     Blood Expiration Date 96776027730888     Unit Type A Pos     Unit Number [id number]     Status Transfused     Component Red Blood Cells     PRODUCT CODE [id number]     Issue Date and Time [id number]     Blood Type 6200     CODING SYSTEM [id number]    Magnesium   Result Value Ref Range    Magnesium 1.4 (L) 1.8 - 2.6 mg/dL   Basic Metabolic Panel   Result Value Ref Range    Sodium 132 (L) 136 - 145 mmol/L    Potassium 4.1 3.5 - 5.0 mmol/L    Chloride 103 98 - 107 mmol/L    CO2 30 22 - 31 mmol/L    Anion Gap, Calculation <0 (L) 5 - 18 mmol/L    Glucose 90 70 - 125 mg/dL    Calcium 8.0 (L) 8.5 - 10.5 mg/dL    BUN 8 8 - 22 mg/dL    Creatinine 0.67 0.60 - 1.10 mg/dL    GFR MDRD Af Amer >60 >60 mL/min/1.73m2    GFR MDRD Non Af Amer >60 >60 mL/min/1.73m2   Prealbumin   Result Value Ref Range    Prealbumin 10.4 (L) 19.0 - 38.0 mg/dL   HM1 (CBC with Diff)   Result Value Ref Range    WBC 9.4 4.0 - 11.0 thou/uL    RBC 2.43 (L) 3.80 - 5.40 mill/uL    Hemoglobin 7.8 (L) 12.0 - 16.0 g/dL    Hematocrit 23.1 (L) 35.0 - 47.0 %    MCV 95 80 - 100 fL    MCH 32.1 27.0 - 34.0 pg    MCHC 33.8 32.0 - 36.0 g/dL    RDW 13.8 11.0 - 14.5 %    Platelets 107 (L) 140 - 440 thou/uL    MPV 9.8 8.5 - 12.5 fL   Manual Differential   Result Value Ref Range    Total Neutrophils % 71 (H) 50 - 70 %    Lymphocytes % 17 (L) 20 - 40 %    Monocytes % 3 2 - 10 %    Eosinophils %  2 0 - 6 %    Basophils % 0 0 - 2 %    Metamyelocytes % 3 (H) <=1 %    Myelocytes % 5 (H) <=1 %    Promyelocytes % 1 (H) <=0 %    Total Neutrophils Absolute 6.7 2.0 - 7.7 thou/ul    Lymphocytes Absolute 1.6 0.8 - 4.4 thou/uL    Monocytes Absolute 0.3 0.0 - 0.9 thou/uL    Eosinophils Absolute 0.1 0.0 - 0.4 thou/uL    Basophils Absolute 0.0 0.0 - 0.2 thou/uL    Metamyelocytes Absolute 0.2 (H) <=0.1 thou/uL    Myelocytes Absolute 0.5 (H) <=0.1 thou/uL    Promyelocytes Absolute 0.0 <=0.1 thou/uL     Reactive Lymphocytes 1+ (!) Negative    Platelet Estimate Decreased (!) Normal     Ct Head Without Contrast    Result Date: 8/16/2017   CT HEAD WO CONTRAST 8/16/2017 6:28 AM INDICATION: Confusion with polyarthropathy TECHNIQUE: Routine. Dose reduction techniques were used. CONTRAST: None. COMPARISON:  None FINDINGS: No intracranial hemorrhage, extraaxial collection, mass effect or CT evidence of acute infarct.  Normal parenchymal density for age. The ventricles and sulci are normal for age. Osseous structures are intact. The visualized orbits, paranasal sinuses and mastoid air cells are free of significant disease.     CONCLUSION: 1.  Normal head CT. 2.  No CT finding of a mass, infarct or hemorrhage.     Mr Thoracic Spine With Without Contrast    Result Date: 8/16/2017  [facility] MR THORACIC SPINE W WO CONTRAST, MR LUMBAR SPINE W WO CONTRAST 8/16/2017 11:39 AM INDICATION: Urinary retention, leg pain, leukocytosis and elevated inflammatory markers. TECHNIQUE: THORACIC SPINE MRI: Performed without and with IV contrast. LUMBAR SPINE MRI: Performed without and with IV contrast. CONTRAST: 7 mL IV Gadavist. SEDATION: None. COMPARISON: None. FINDINGS: THORACIC SPINE MRI: Normal sagittal alignment of the thoracic spine. Vertebral body heights are maintained. Mildly heterogeneous marrow signal. There is a small lesion involving the posterior T10 vertebral body, which demonstrates T2 prolongation and enhancement. There might be subtle associated T1 shortening at T10. In addition, there is a similar-appearing lesion in the mid L1 vertebral body with similar characteristics. Small atypical hemangioma at T12. Intervertebral disc spaces are maintained. No high-grade spinal canal narrowing. No high-grade right-sided neural foraminal narrowing. No high-grade left-sided neural foraminal narrowing. No abnormal cord signal. No abnormal signal within the intervertebral discs. The paraspinal soft tissues  are grossly within normal limits. Small left pleural effusion. There are bibasilar atelectasis. Normal diameter of the descending aorta. LUMBAR SPINE MRI: Nomenclature is based on 5 lumbar type vertebral bodies. The conus ends at L1. There is mild levocurvature of the lumbar spine. Sagittal alignment is within normal limits. Vertebral body heights are maintained. Heterogeneous marrow signal. There is an ovoid focus of T2 prolongation and enhancement involving the L1 vertebral body. No other areas of abnormal enhancement. No MRI evidence for pars defects. The visualized bony pelvis and proximal femora are grossly within normal limits.  The paraspinal soft tissues are grossly within normal limits. Normal diameter of the descending aorta. T12-L1: Normal disc height and signal. No herniation. No facet arthropathy. No spinal canal stenosis. No right neural foraminal stenosis. No left neural foraminal stenosis. L1-L2: Normal disc height. Loss of the normal T2 signal within the disc. No herniation. Mild bilateral facet arthropathy. No spinal canal stenosis. No right neural foraminal stenosis. No left neural foraminal stenosis. L2-L3: Normal disc height and signal. Shallow broad-based disc bulge. Mild bilateral facet arthropathy. No spinal canal stenosis. No right neural foraminal stenosis. No left neural foraminal stenosis. L3-L4: Normal intervertebral disc height and signal. There is a shallow broad-based disc bulge with superimposed small central/right paracentral disc protrusion. Mild bilateral facet arthropathy. No spinal canal narrowing. No neural foraminal narrowing. L4-L5: Normal disc height and signal. Shallow broad-based disc bulge. Mild bilateral facet arthropathy. No spinal canal stenosis. No right neural foraminal stenosis. No left neural foraminal stenosis. L5-S1: Normal disc height and signal. No herniation. Mild bilateral facet arthropathy. No spinal canal stenosis. No right neural foraminal stenosis. No  left neural foraminal stenosis.     CONCLUSION: THORACIC SPINE MRI: 1.  No MRI evidence for discitis/osteomyelitis. 2.  Mild degenerative changes of the thoracic spine. No high-grade spinal canal or neural foraminal narrowing. 3.  The small focus of T2 prolongation and enhancement at the T10 vertebral body has mild associated T1 shortening and is an atypical hemangioma. LUMBAR SPINE MRI: 1.  No MRI evidence for discitis/osteomyelitis. 2.  Mild degenerative changes of the lumbar spine. No high-grade spinal canal or neural foraminal narrowing. 3.  There is a small focus of T2 prolongation and enhancement at the L1 vertebral body without definite associated T1 shortening. This is indeterminate. It could still represent an atypical hemangioma. Recommend obtaining thin cut CT images at this level  for further evaluation.     Mr Lumbar Spine With Without Contrast    Result Date: 8/16/2017  [facility] MR THORACIC SPINE W WO CONTRAST, MR LUMBAR SPINE W WO CONTRAST 8/16/2017 11:39 AM INDICATION: Urinary retention, leg pain, leukocytosis and elevated inflammatory markers. TECHNIQUE: THORACIC SPINE MRI: Performed without and with IV contrast. LUMBAR SPINE MRI: Performed without and with IV contrast. CONTRAST: 7 mL IV Gadavist. SEDATION: None. COMPARISON: None. FINDINGS: THORACIC SPINE MRI: Normal sagittal alignment of the thoracic spine. Vertebral body heights are maintained. Mildly heterogeneous marrow signal. There is a small lesion involving the posterior T10 vertebral body, which demonstrates T2 prolongation and enhancement. There might be subtle associated T1 shortening at T10. In addition, there is a similar-appearing lesion in the mid L1 vertebral body with similar characteristics. Small atypical hemangioma at T12. Intervertebral disc spaces are maintained. No high-grade spinal canal narrowing. No high-grade right-sided neural foraminal narrowing. No high-grade left-sided neural foraminal narrowing. No abnormal  cord signal. No abnormal signal within the intervertebral discs. The paraspinal soft tissues are grossly within normal limits. Small left pleural effusion. There are bibasilar atelectasis. Normal diameter of the descending aorta. LUMBAR SPINE MRI: Nomenclature is based on 5 lumbar type vertebral bodies. The conus ends at L1. There is mild levocurvature of the lumbar spine. Sagittal alignment is within normal limits. Vertebral body heights are maintained. Heterogeneous marrow signal. There is an ovoid focus of T2 prolongation and enhancement involving the L1 vertebral body. No other areas of abnormal enhancement. No MRI evidence for pars defects. The visualized bony pelvis and proximal femora are grossly within normal limits.  The paraspinal soft tissues are grossly within normal limits. Normal diameter of the descending aorta. T12-L1: Normal disc height and signal. No herniation. No facet arthropathy. No spinal canal stenosis. No right neural foraminal stenosis. No left neural foraminal stenosis. L1-L2: Normal disc height. Loss of the normal T2 signal within the disc. No herniation. Mild bilateral facet arthropathy. No spinal canal stenosis. No right neural foraminal stenosis. No left neural foraminal stenosis. L2-L3: Normal disc height and signal. Shallow broad-based disc bulge. Mild bilateral facet arthropathy. No spinal canal stenosis. No right neural foraminal stenosis. No left neural foraminal stenosis. L3-L4: Normal intervertebral disc height and signal. There is a shallow broad-based disc bulge with superimposed small central/right paracentral disc protrusion. Mild bilateral facet arthropathy. No spinal canal narrowing. No neural foraminal narrowing. L4-L5: Normal disc height and signal. Shallow broad-based disc bulge. Mild bilateral facet arthropathy. No spinal canal stenosis. No right neural foraminal stenosis. No left neural foraminal stenosis. L5-S1: Normal disc height and signal. No herniation. Mild  bilateral facet arthropathy. No spinal canal stenosis. No right neural foraminal stenosis. No left neural foraminal stenosis.     CONCLUSION: THORACIC SPINE MRI: 1.  No MRI evidence for discitis/osteomyelitis. 2.  Mild degenerative changes of the thoracic spine. No high-grade spinal canal or neural foraminal narrowing. 3.  The small focus of T2 prolongation and enhancement at the T10 vertebral body has mild associated T1 shortening and is an atypical hemangioma. LUMBAR SPINE MRI: 1.  No MRI evidence for discitis/osteomyelitis. 2.  Mild degenerative changes of the lumbar spine. No high-grade spinal canal or neural foraminal narrowing. 3.  There is a small focus of T2 prolongation and enhancement at the L1 vertebral body without definite associated T1 shortening. This is indeterminate. It could still represent an atypical hemangioma. Recommend obtaining thin cut CT images at this level  for further evaluation.     Xr Aspiration Or Injection Major Joint    Result Date: 8/16/2017  1. LEFT SHOULDER JOINT ASPIRATION FOR FLUID and CULTURES 2. FLUOROSCOPIC GUIDANCE 8/16/2017 2:11 PM INDICATION: Pain possible septic joint, evidence for septic right ankle joint. PROCEDURE: Procedure and risks explained and consent received. The skin was prepped and draped in sterile fashion. 10 mL 1% lidocaine infused in local soft tissues. Under direct fluoroscopic guidance, a 22 gauge spinal needle was introduced into the joint space. Approximately 1 mL of cloudy fluid was obtained and then no further fluid could be obtained. A small amount of contrast was then injected to confirm that the needle was intra-articular and approximately 4 mL of saline were injected in the joint and aspirated back. COMPLICATIONS: None. SPECIMEN: The tube labeled #1 has approximately 1 mL of cloudy joint fluid. The tube labeled #2 has 3 to 4 mL of saline that was injected in into joint space and aspirated back. RADIOLOGIC SUPERVISION AND INTERPRETATION:  Fluoroscopy demonstrates intraarticular needle tip position. FLUOROSCOPIC TIME: 1.5 minutes. NUMBER OF IMAGES: 3.     CONCLUSION: Fluoroscopic guided aspiration of left shoulder for cultures. Approximately 1 cc of cloudy fluid was initially obtained. No further fluid could obtained at that point and 4 cc of nonbacteriostatic saline were injected into the joint space and aspirated back.    Ct Ankle With Contrast Right    Result Date: 8/16/2017  [facility] CT OF THE RIGHT ANKLE with contrast 8/16/2017 6:29 AM INDICATION: Swelling, warmth and tenderness TECHNIQUE: Postcontrast after administration of 100 mL of Omnipaque 350 IV. Axial, sagittal and coronal thin-section reconstruction. Dose reduction techniques were used. COMPARISON: None. FINDINGS: There is soft tissue thickening and reticulation about the ankle, with involvement of the subcutaneous tissues. There is no deep compartment involvement. No drainable fluid collection. There is a tibiotalar joint effusion. No rim-enhancing collections are present. There is no fracture or dislocation. Joint spaces are maintained.     CONCLUSION: 1.  Diffuse soft tissue thickening and edema about the ankle is likely related to inflammation or cellulitis. 2.  Tibiotalar joint effusion. No fracture.     Us Aspiration Or Injection Intermediate    Result Date: 8/16/2017  1. RIGHT ANKLE JOINT ASPIRATION 2. ULTRASOUND GUIDANCE 8/16/2017 9:54 AM INDICATION: Septic joint. PROCEDURE: Procedure and risks explained and consent received. The skin was prepped and draped in sterile fashion. 10 mL 1% lidocaine infused in local soft tissues. Under direct imaging guidance, a 18-gauge needle was introduced into the joint space. COMPLICATIONS: None. SPECIMEN: 7 mL of thick purulent-appearing fluid was aspirated and sent to lab as ordered by patient's provider. RADIOLOGIC SUPERVISION AND INTERPRETATION: Imaging demonstrates intraarticular needle tip position.     CONCLUSION: Successful  image-guided right ankle joint aspiration with 7 mL of purulent appearing fluid aspirated. Findings discussed with patient's ER provider, Dr. Chantel Martinez, on 8/16/2017 at 0945 hours.      Assessment/Plan:    Arthritis of her right ankle status post incision and drainage of the right ankle as well as left shoulder and currently discharged on IV ceftriaxone for total of 14 days.  She had workup for STDs and was negative for chlamydia and gonorrhea and HIV.   her blood cultures were positive for strep pneumonia.  She had workup for endocarditis including an transesophageal echocardiogram which did not show any growth  She will then switch to 2 weeks of oral antibiotics and follow outpatient with infectious disease as scheduled in 3 weeks  Hyponatremia secondary to hypovolemia.  Plan will be to monitor her BMPs in the TCU  Acute rhabdomyolysis with unclear etiology.  Acute encephalopathy felt to be metabolic.  She is still not back to baseline with an initial testing cognitive slums of 20/30 CT head was negative  Patient however personally feels she is back to baseline with no confusion.  Anemia chronic hemoglobin dropped down to 6.9 in the hospital and she was given 1 unit of blood transfusion.  She had additional workup showing low iron studies but normal B12 folic acid and negative Hemoccult and she is discharged on oral iron supplements  Chronic back pain with MRI workup was negative for any osteomyelitis or discitis continue with pain management  Electrolyte imbalance with low potassium and magnesium  Depression on her home regimen rates her mood is stable  Malnutrition  Generalized weakness with deconditioning here for PT OT and rehab.  Actually doing well and she plans to discharge back home once she is able to get off her IV antibiotics.    Total time spent was 45 minutes, more than half of it was in face-to-face counseling regarding disease state, treatment, side effects, documentation, review of clinical  data and coordination of care  Electronically signed by: IRAM Dickinson  This progress note was completed using Dragon software and there may be grammatical errors.

## 2021-07-20 NOTE — PROGRESS NOTES
Patient has been sleeping off and on throughout shift, is easy to arouse - last had oxycodone at 0530 - has been getting scheduled acetaminophen - wll continue to monitor pain and medicate accordingly

## 2021-07-20 NOTE — PROGRESS NOTES
"Pharmacy Consult: Vancomycin Dosing    Pharmacist consulted to dose vancomycin for Lauryn Raya, a 46 y.o. female.    Ordering provider: Dr Starks    Indication for vancomycin therapy: septic shoulder and ankle    Goal Trough Range:  10-20 mcg/mL based on indication    Other current antimicrobials             vancomycin 1,000 mg in dextrose 5% 250 mL (VANCOCIN)  Every 12 hours          ceFAZolin 1 g in NaCl 0.9 % 50 mL (MINI-BAG Plus) (ANCEF)  Every 8 hours          piperacillin-tazobactam 3.375 g in NaCl 0.9 % 100 mL (MINI-BAG Plus) (ZOSYN)  Every 8 hours             Subjective/Objective:    Patient was admitted for Septic arthritis on 8/16/2017    Height: 5' 6\" (1.676 m)    Actual Body Weight (ABW): 60.3 kg (133 lb)    Ideal body weight: 59.3 kg (130 lb 11.7 oz)  Adjusted ideal body weight: 59.7 kg (131 lb 10.2 oz)    BMI: Body mass index is 21.47 kg/(m^2).    No Known Allergies    Patient Active Problem List   Diagnosis     Polyarthropathy     Septic arthritis     Hyponatremia     Hypokalemia     Acute encephalopathy     Non-traumatic rhabdomyolysis    Past Medical History:   Diagnosis Date     Anxiety      Depression      Septic arthritis         Temp Readings from Current Encounter:     08/16/17 1820 08/16/17 1845 08/16/17 1911   Temp: 99  F (37.2  C) 98.4  F (36.9  C) 97.5  F (36.4  C)     Net Intake/Output (last 24 hours):  I/O last 3 completed shifts:  In: 1000 [I.V.:1000]  Out: 750 [Urine:750]    Recent Labs      08/16/17   0503   WBC  16.5*   LACTICACID  1.2   CRP  20.9*   BUN  15   CREATININE  0.86     Estimated Creatinine Clearance: 79.5 mL/min (by C-G formula based on Cr of 0.86).    No results for input(s): CULTURE in the last 72 hours.    No results found for any visits on 08/16/17.    No results for input(s): VANCOMYCIN in the last 168 hours.    Vancomycin administrations: (last 120 hours)     Date/Time Action Medication Dose Rate    08/16/17 1256 New Bag    vancomycin 1 g in dextrose 5% 250 mL " (VANCOCIN) 1 g 135 mL/hr          Assessment/Plan:    Pharmacist consulted to dose vancomycin for septic shoulder and ankle, goal trough range 10-20 mcg/mL.  1. Continue vancomycin 1000 mg IV every 12 hours (16 mg/kg actual body weight).  2. No vancomycin level available for assessment.  3. Pharmacist will plan to check a vancomycin trough level before 4th or 5th dose.  4. Pharmacist will continue to follow.    Thank you for the consult.  Raegan Wyatt RPh 8/16/2017 7:25 PM

## 2021-07-20 NOTE — PROGRESS NOTES
"Progress Notes by Hilario Rubio MD at 8/18/2017  9:56 AM     Author: Hilario Rubio MD Service: Infectious Disease Author Type: Physician    Filed: 8/18/2017 10:00 AM Date of Service: 8/18/2017  9:56 AM Status: Signed    : Hilario Rubio MD (Physician)       Infectious Disease Progress Note    Assessment/Plan  Impression: Strep pneumoniae bacteremia and septic arthritis of the right ankle.  Transesophageal echocardiography was negative for vegetations.     Recommendations: Change antibiotic to ceftriaxone for two weeks, then po cefdinir for two weeks.  OK for PICC line and discharge from ID standpoint.      Principal Problem:    Septic arthritis  Active Problems:    Polyarthropathy    Sepsis, due to unspecified organism    Anemia, unspecified type    Hypomagnesemia      Hilario Rubio MD  [phone]      Subjective  Complains of some L hip pain today.  Mushy stool.    Objective    Vital signs in last 24 hours  Temp:  [97.8  F (36.6  C)-98.6  F (37  C)] 98.5  F (36.9  C)  Heart Rate:  [69-90] 80  Resp:  [16-26] 18  BP: ()/(59-87) 122/80  Weight:   133 lb (60.3 kg)    Intake/Output last 3 shifts  I/O last 3 completed shifts:  In: 3646 [P.O.:840; I.V.:2106; IV Piggyback:700]  Out: 1650 [Urine:1650]  Intake/Output this shift:       Review of Systems   Pertinent items are noted in HPI., otherwise negative.    Physical Exam  /80 (Patient Position: Lying)  Pulse 80  Temp 98.5  F (36.9  C) (Oral)   Resp 18  Ht 5' 6\" (1.676 m)  Wt 133 lb (60.3 kg)  LMP 08/02/2017  SpO2 96%  BMI 21.47 kg/m2  General appearance: alert, appears stated age and cooperative  Head: Normocephalic, without obvious abnormality, atraumatic  Eyes: Extraocular muscles intact, no icterus.  Throat: lips, mucosa, and tongue normal; teeth and gums normal  Lungs: clear to auscultation bilaterally  Heart: regular rate and rhythm, S1, S2 normal, no murmur, click, rub or gallop  Abdomen: soft, non-tender; bowel " sounds normal; no masses,  no organomegaly  Extremities: Right ankles wrapped in a dressing.  Left shoulder also has a dressing.  No pain in L hip with passive ROM.  Skin: Skin color, texture, turgor normal. No rashes or lesions  Neurologic: Grossly normal    Pertinent Labs   Lab Results: personally reviewed.       Results from last 7 days  Lab Units 08/18/17  0551 08/17/17  1217 08/17/17  0552   LN-WHITE BLOOD CELL COUNT thou/uL 8.0 8.9 9.0   LN-HEMOGLOBIN g/dL 7.2* 7.3* 7.5*   LN-HEMATOCRIT % 21.4* 21.8* 21.9*   LN-PLATELET COUNT thou/uL 116* 103* 105*        Results from last 7 days  Lab Units 08/18/17  0551 08/17/17  0552 08/16/17  0503   LN-SODIUM mmol/L 130* 129* 120*   LN-POTASSIUM mmol/L 3.5 3.9 3.3*   LN-CHLORIDE mmol/L 105 103 93*   LN-CO2 mmol/L 26 25 24   LN-BLOOD UREA NITROGEN mg/dL 12 14 15   LN-CREATININE mg/dL 0.66 0.76 0.86   LN-CALCIUM mg/dL 7.9* 9.6 11.1*       HIV negative.    Microbiology Results (last 7 days)        Procedure Component Value Units Date/Time       Culture, Blood Positive Work-up [[id number]] (Abnormal) Collected: 08/16/17 0653       Order Status: Completed Specimen: Blood Updated: 08/18/17 0756        Culture Streptococcus pneumoniae (!)         Susceptibility testing done on previous culture   Reported and sent to MD as part of the  Emerging Infections Surveillance Program.           Gram Stain Result Gram positive cocci       Culture, Blood Positive Work-up [[id number]] (Abnormal)  Collected: 08/16/17 0526       Order Status: Completed Specimen: Blood Updated: 08/18/17 0752        Culture Streptococcus pneumoniae (!)         Reported and sent to MD as part of the  Emerging Infections Surveillance Program.           Gram Stain Result Gram positive cocci         Susceptibility         Streptococcus pneumoniae         ABIGAIL        Ceftriaxone 0.5  Sensitive        Ceftriaxone (Meningitis) Sensitive        Ceftriaxone (NonMeningitis) Sensitive        Penicillin 2  Resistant         Penicillin g (Meningitis) Resistant        Penicillin G (NonMeningitis) Sensitive        Penicillin G (Oral) Resistant                              Culture, Urine [[id number]] Collected: 08/17/17 1230       Order Status: Completed Specimen: Urine Updated: 08/18/17 0727        Culture No Growth       Culture/Gram Stain: Body Fluid [[id number]] Collected: 08/16/17 1409       Order Status: Completed Specimen: Body Fluid from Other Updated: 08/17/17 1333        Culture No growth to date        Gram Stain Result No polymorphonuclear leukocytes seen         No organisms seen         Results reviewed and amended by microbiology staff          See corrected result below         4+ Polymorphonuclear leukocytes         No organisms seen       Chlamydia trachomatis & Neisseria gonorrhoeae, Amplified Detection [[id number]] (Normal) Collected: 08/16/17 0839       Order Status: Completed Specimen: Body Fluid Updated: 08/17/17 1320        Chlamydia trachomatis, Amplified Detection Negative        Neisseria gonorrhoeae, Amplified Detection Negative       Culture and Gram Stain, Drainage [[id number]] (Abnormal) Collected: 08/16/17 0953       Order Status: Completed Specimen: Body Fluid from Joint, Other Updated: 08/17/17 1106        Culture 4+ Alpha Strep to be Identified (!)        Gram Stain Result 4+ Polymorphonuclear leukocytes         4+ Gram positive cocci in pairs         2+ Gram positive cocci in chains       Culture/Gram Stain: Body Fluid [[id number]]        Order Status: Sent Specimen: Body Fluid from Shoulder, Left         Pertinent Radiology   Radiology Results: Personally reviewed impression/s  No results found.

## 2021-07-20 NOTE — PROGRESS NOTES
Pharmacy Consult: Vancomycin Dosing in the Emergency Department    Pharmacist consulted by Dr. Martinez to dose vancomycin for Lauryn Raya, a 46 y.o. female.    Indication for vancomycin therapy: non-specific polyarthropathy, leukocytosis    Other current antimicrobials             vancomycin 1 g in dextrose 5% 250 mL (VANCOCIN)  Once          piperacillin-tazobactam 3.375 g in NaCl 0.9 % 50 mL (MINI-BAG Plus) (ZOSYN)  Once             Assessment/Plan    1. Vancomycin 1000 mg IV once in the ED (17 mg/kg actual body weight).  2. If the patient is admitted to the hospital and vancomycin therapy should continue, please re-consult pharmacy.    Subjective/Objective    Lauryn Raya presented to the ED on 8/16/2017 for Shoulder Pain    Height:    Actual Body Weight (ABW): 59 kg (130 lb)    Patient height not recorded    BMI: There is no height or weight on file to calculate BMI.    No Known Allergies    Patient Active Problem List   Diagnosis     Polyarthropathy    No past medical history on file.     Temp Readings from Current Encounter:     08/16/17 0450   Temp: 100.2  F (37.9  C)       Recent Labs      08/16/17   0503   WBC  16.5*   LACTICACID  1.2   CRP  20.9*     Recent Labs      08/16/17   0503   BUN  15   CREATININE  0.86       CrCl cannot be calculated (Unknown ideal weight.).    Thank you for the consult,  Torri Garcia, PharmD  8/16/2017  10:07 AM

## 2021-07-20 NOTE — PROGRESS NOTES
SLUMS  Scoring If High School Educated If Less than High School Educated   Normal 27-30 25-30   Mild Neurocognitive Disorder 21-26 20-24   Dementia 1-20 1-19   The SLUMS is a cognitive screening assessment used to identify the presence of cognitive deficits, and/or to identify a change in cognition over time.      Patient Score: 20/30    Score Interpretation: This score is in  the dementia category, bordering on mild impairment. Pt was cooperative for screening.  Pt had difficulty with processing, attention span, problem solving, computation and memory/recall.      8/18/2017 by Mary T Reyes, OT

## 2021-07-20 NOTE — PROGRESS NOTES
Assessment:     Impression: Status post 2 weeks IV followed by 2 weeks of oral antibiotics for Streptococcus pneumoniae bacteremia and risks of the right ankle and left shoulder.  Seems to be improving on all fronts, other than some anemia noted a few weeks ago.    Patient did have a negative HIV test in the hospital.  We have not check quantitative immunoglobulins.     Plan:     Orders Placed This Encounter   Procedures     Immunoglobulins IgG, IgA, IgM     IgG Subclasses     HM1 (CBC with Diff)        We will check the above studies today.  Follow-up as needed.     Subjective:      This is an follow-up infectious Disease visit for Lauryn Raya, who is a 46 y.o.  referred for evaluation of Streptococcus pneumonia bacteremia, right ankle septic arthritis and left shoulder septic arthritis.     Patient is a pleasant 46-year-old woman I met at [facility].  She had the above-stated problems.  She has finished 2 weeks of IV ceftriaxone followed by 2 weeks of oral cefdinir.    She is feeling much better.  She has limited but improving range of motion of the left shoulder.  She has minimal pain in the right ankle.  She is having no fevers, chills, sweats.    Last dose of oral antibiotics was yesterday.    She had unfortunate death in the family, specifically her significant other, who is also the father of her daughter, passed away recently.  He had a staph infection on top of an underlying severe medical condition.    The following portions of the patient's history were reviewed and updated as appropriate: allergies, current medications, past family history, past medical history, past social history, past surgical history and problem list.      Review of Systems  Performed and all negative except as mentioned above.      Objective:      /70  Pulse 88  Temp 98  F (36.7  C)  Wt 122 lb (55.3 kg)  LMP 08/02/2017  BMI 19.69 kg/m2  General:   alert, appears stated age and cooperative   Oropharynx:  lips,  mucosa, and tongue normal; teeth and gums normal    Eyes:   Extraocular muscles intact, no icterus.    Ears:   Deferred   Neck:  no adenopathy and supple, symmetrical, trachea midline   Thyroid:   Deferred   Lung:  Normal respiratory pattern, no distress   Heart:   Deferred   Abdomen:  Deferred   Extremities:  Right ankle appears to be healing well.  No erythema or warmth or tenderness.  Left shoulder she is able to raise her arm above parallel, but just barely.   Skin:  warm and dry, no hyperpigmentation, vitiligo, or suspicious lesions   CVA:   Deferred   Genitourinary:  defer exam   Neurological:   Grossly normal   Psychiatric:   normal mood, behavior, speech, dress, and thought processes         Hilario Rubio MD

## 2021-07-20 NOTE — PROGRESS NOTES
Starting blood and explained to patient about possible reactions and she should let her know if she has any symptoms.  Will continue to monitor

## 2021-07-20 NOTE — PROGRESS NOTES
"Progress Notes by Hilario Rubio MD at 8/19/2017  8:54 AM     Author: Hilario Rubio MD Service: Infectious Disease Author Type: Physician    Filed: 8/19/2017  8:56 AM Date of Service: 8/19/2017  8:54 AM Status: Signed    : Hilario Rubio MD (Physician)       Infectious Disease Progress Note    Assessment/Plan  Impression: Strep pneumoniae bacteremia and septic arthritis of the right ankle and left shoulder.  Transesophageal echocardiography was negative for vegetations.     Recommendations: Changed antibiotic to ceftriaxone for two weeks, then po cefdinir for two weeks.      OK to discharge to home anytime from Infectious Disease standpoint.        Principal Problem:    Septic arthritis  Active Problems:    Polyarthropathy    Sepsis, due to unspecified organism    Anemia, unspecified type    Hypomagnesemia    Iron deficiency anemia    Weakness      Hilario Rubio MD  [phone]      Subjective  Still with pain in back and ankle.    Objective    Vital signs in last 24 hours  Temp:  [97.9  F (36.6  C)-98.7  F (37.1  C)] 98.7  F (37.1  C)  Heart Rate:  [83-98] 83  Resp:  [16-18] 18  BP: (118-146)/(73-88) 131/79  Weight:   133 lb (60.3 kg)    Intake/Output last 3 shifts  I/O last 3 completed shifts:  In: 2767 [P.O.:960; I.V.:1807]  Out: -   Intake/Output this shift:       Review of Systems   Pertinent items are noted in HPI., otherwise negative.    Physical Exam  /79 (Patient Position: Lying)  Pulse 83  Temp 98.7  F (37.1  C) (Oral)   Resp 18  Ht 5' 6\" (1.676 m)  Wt 133 lb (60.3 kg)  LMP 08/02/2017  SpO2 96%  BMI 21.47 kg/m2  General appearance: alert, appears stated age and cooperative  Head: Normocephalic, without obvious abnormality, atraumatic  Eyes: Extraocular muscles intact, no icterus.  Throat: lips, mucosa, and tongue normal; teeth and gums normal  Lungs: clear to auscultation bilaterally  Heart: regular rate and rhythm, S1, S2 normal, no murmur, click, rub or " gallop  Abdomen: soft, non-tender; bowel sounds normal; no masses,  no organomegaly  Extremities: Right ankle dressing taken down.  Some edema, but no significant erythema or warmth.  Not much pain with passive range of motion of ankle.  Skin: Skin color, texture, turgor normal. No rashes or lesions  Neurologic: Grossly normal    Pertinent Labs   Lab Results: personally reviewed.       Results from last 7 days  Lab Units 08/19/17  0609 08/18/17  0551 08/17/17  1217   LN-WHITE BLOOD CELL COUNT thou/uL 9.6 8.0 8.9   LN-HEMOGLOBIN g/dL 6.9* 7.2* 7.3*   LN-HEMATOCRIT % 20.0* 21.4* 21.8*   LN-PLATELET COUNT thou/uL 103* 116* 103*          Results from last 7 days  Lab Units 08/19/17  0609 08/18/17  1640 08/18/17  0551 08/17/17  0552   LN-SODIUM mmol/L 130*  --  130* 129*   LN-POTASSIUM mmol/L 4.0 3.7 3.5 3.9   LN-CHLORIDE mmol/L 107  --  105 103   LN-CO2 mmol/L 24  --  26 25   LN-BLOOD UREA NITROGEN mg/dL 7*  --  12 14   LN-CREATININE mg/dL 0.63  --  0.66 0.76   LN-CALCIUM mg/dL 7.7*  --  7.9* 9.6       HIV negative.    Microbiology Results (last 7 days)        Procedure Component Value Units Date/Time       Culture and Gram Stain, Drainage [[id number]] (Abnormal)  Collected: 08/16/17 0953       Order Status: Completed Specimen: Body Fluid from Joint, Other Updated: 08/18/17 1405        Culture 4+ Streptococcus pneumoniae (!)        Gram Stain Result 4+ Polymorphonuclear leukocytes         4+ Gram positive cocci in pairs         2+ Gram positive cocci in chains         Susceptibility         Streptococcus pneumoniae         ABIGAIL        Ceftriaxone 0.5  Sensitive        Ceftriaxone (Meningitis) Sensitive        Ceftriaxone (NonMeningitis) Sensitive        Penicillin 2  Resistant        Penicillin g (Meningitis) Resistant        Penicillin G (NonMeningitis) Sensitive        Penicillin G (Oral) Resistant                              Culture/Gram Stain: Body Fluid [[id number]] (Abnormal) Collected: 08/16/17 140       Order  Status: Completed Specimen: Body Fluid from Other Updated: 08/18/17 1327        Culture 1+ Alpha Strep to be Identified (!)        Gram Stain Result No polymorphonuclear leukocytes seen         No organisms seen         Results reviewed and amended by microbiology staff          See corrected result below         4+ Polymorphonuclear leukocytes         No organisms seen       Culture, Blood Positive Work-up [[id number]] (Abnormal) Collected: 08/16/17 0653       Order Status: Completed Specimen: Blood Updated: 08/18/17 0756        Culture Streptococcus pneumoniae (!)         Susceptibility testing done on previous culture   Reported and sent to MD as part of the  Emerging Infections Surveillance Program.           Gram Stain Result Gram positive cocci       Culture, Blood Positive Work-up [[id number]] (Abnormal)  Collected: 08/16/17 0526       Order Status: Completed Specimen: Blood Updated: 08/18/17 0752        Culture Streptococcus pneumoniae (!)         Reported and sent to MD as part of the  Emerging Infections Surveillance Program.           Gram Stain Result Gram positive cocci         Susceptibility         Streptococcus pneumoniae         ABIGAIL        Ceftriaxone 0.5  Sensitive        Ceftriaxone (Meningitis) Sensitive        Ceftriaxone (NonMeningitis) Sensitive        Penicillin 2  Resistant        Penicillin g (Meningitis) Resistant        Penicillin G (NonMeningitis) Sensitive        Penicillin G (Oral) Resistant                              Culture, Urine [[id number]] Collected: 08/17/17 1230       Order Status: Completed Specimen: Urine Updated: 08/18/17 0727        Culture No Growth       Chlamydia trachomatis & Neisseria gonorrhoeae, Amplified Detection [[id number]] (Normal) Collected: 08/16/17 0839       Order Status: Completed Specimen: Body Fluid Updated: 08/17/17 1320        Chlamydia trachomatis, Amplified Detection Negative        Neisseria gonorrhoeae, Amplified Detection Negative       Culture/Gram  Stain: Body Fluid [[id number]]        Order Status: Sent Specimen: Body Fluid from Shoulder, Left           Pertinent Radiology   Radiology Results: Personally reviewed impression/s  No results found.

## 2021-07-20 NOTE — PROGRESS NOTES
[facility]      Code Status:  FULL CODE  Visit Type: Review Of Multiple Medical Conditions     Facility:  [facility] [[id number]]           History of Present Illness: Lauryn Raya is a 46 y.o. female admitted to the TCU as a transfer from the hospital and was examined in the presence of both her parents.  This is a previously healthy 46-year-old with underlying history of depression or chronic back pain who presented to the hospital for evaluation of bilateral shoulder pain with hip and foot pain she was also noted to be very confused at the time of her presentation.  Had presented to her primary care physician for similar complaints about 2 weeks ago prior to this hospitalization and was felt to have musculoskeletal aches and pains and given symptomatic treatment.  Orthopedics saw this patient, a CT of the right ankle showed no fractures or dislocation but soft tissue swelling and tibiotalar joint effusion and it was felt with the setting of multiple septic joints and a toxic appearance and abnormal labs she should have surgery so she underwent open irrigation and debridement of her right ankle with arthroscopic irrigation and debridement of her left shoulder done on 8/16/17  The exact etiology of her infections remains unclear she drank denies any IV drug abuse also and her cultures grew alpha hemolytic strep bacteremia with septic arthritis right ankle. a transesophageal echocardiogram was negative for any vegetations and she is currently on 2 weeks of IV ceftriaxone and subsequently she will switch to oral antibiotics with outpatient follow-up with infectious disease  Debility she seems to be slowly coming back to baseline however her current cognitive testing score was 20/30.  Per nursing she is stable but does have intermittent episodes of confusion which the patient is denying  She continues to have painful ambulation with weightbearing but overall her  incisions are healing well  Her labs in the hospital had shown significant malnutrition with iron deficiency anemia and she was given IV iron and recheck labs are still pending    Past Medical History:   Diagnosis Date     Anxiety      Depression      Septic arthritis      Past Surgical History:   Procedure Laterality Date     PICC  8/18/2017          No family history on file.  Social History     Social History     Marital status:      Spouse name: N/A     Number of children: N/A     Years of education: N/A     Occupational History     Not on file.     Social History Main Topics     Smoking status: Never Smoker     Smokeless tobacco: Never Used     Alcohol use Yes      Comment: occasional     Drug use: No     Sexual activity: Not on file     Other Topics Concern     Not on file     Social History Narrative     Current Outpatient Prescriptions   Medication Sig Dispense Refill     acetaminophen (TYLENOL) 325 MG tablet Take 2 tablets (650 mg total) by mouth every 6 (six) hours as needed. 100 tablet 0     aspirin 325 MG tablet Take 1 tablet (325 mg total) by mouth daily. 30 tablet 0     busPIRone (BUSPAR) 10 MG tablet Take 20 mg by mouth 2 (two) times a day.       [START ON 9/1/2017] cefdinir (OMNICEF) 300 MG capsule Take 1 capsule (300 mg total) by mouth 2 (two) times a day for 14 days. Start after finish IV antibiotics 28 capsule 0     cefTRIAXone 2 g in NaCl 0.9 % 0.9 % 50 mL IVPB Infuse 2 g into a venous catheter daily for 14 days. 1 each 0     ferrous sulfate (FERROUSUL) 325 (65 FE) MG tablet Take 1 tablet (325 mg total) by mouth daily with breakfast.  0     FLUoxetine (PROZAC) 40 MG capsule Take 40 mg by mouth daily.       gabapentin (NEURONTIN) 100 MG capsule Take 100-200 mg by mouth at bedtime as needed (for nerve pain).       ibuprofen (ADVIL,MOTRIN) 200 MG tablet Take 400 mg by mouth every 6 (six) hours as needed for pain.       omeprazole (PRILOSEC) 20 MG capsule Take 1 capsule (20 mg total) by mouth  daily before breakfast.  0     oxyCODONE (ROXICODONE) 5 MG immediate release tablet Take 1-2 tablets (5-10 mg total) by mouth every 4 (four) hours as needed. 60 tablet 0     rizatriptan (MAXALT) 10 MG tablet Take 10 mg by mouth as needed for migraine (may repeat x1 in 2 hours, maximum of 3 tablets/24 hours). May repeat in 2 hours if needed       senna-docusate (PERICOLACE) 8.6-50 mg tablet Take 1 tablet by mouth 2 (two) times a day. 60 tablet 0     No current facility-administered medications for this visit.      No Known Allergies      Review of Systems:    Constitutional: Negative.  Negative for fever, chills, has activity change, appetite change and fatigue.   HENT: Negative for congestion and facial swelling.    Eyes: Negative for photophobia, redness and visual disturbance.   Respiratory: Negative for cough and chest tightness.    Cardiovascular: Negative for chest pain, palpitations and leg swelling.   Gastrointestinal: Negative for nausea, diarrhea, constipation, blood in stool and abdominal distention.   Genitourinary: Negative.    Musculoskeletal: Negative.  Has some pain in her right ankle and left shoulder  Skin: Negative.    Neurological: Negative for dizziness, tremors, syncope, weakness, light-headedness and headaches.   Hematological: Does not bruise/bleed easily.   Psychiatric/Behavioral: Negative.    Her mood is stable    Vitals:    08/23/17 1600   BP: 120/74   Pulse: 81   Resp: 18   Temp: 98  F (36.7  C)       Physical Exam:    GENERAL: no acute distress. Cooperative in conversation.   HEENT: pupils are equal, round and reactive. Oral mucosa is moist and intact.  RESP:Chest symmetric. Regular respiratory rate. No stridor.  CVS: S1S2  ABD: Nondistended, soft.  EXTREMITIES: No lower extremity edema.  Right ankle incision is intact it is painful and tender but no evidence of any a time or drainage from her incision site  Left shoulder incision was healing well also  NEURO: non focal. Alert and  oriented x3.   PSYCH: within normal limits. No depression or anxiety.  SKIN: warm dry intact     Labs:    Lab Results   Component Value Date    WBC 9.4 08/20/2017    HGB 7.8 (L) 08/20/2017    HCT 23.1 (L) 08/20/2017    MCV 95 08/20/2017     (L) 08/20/2017            Assessment/Plan:    Arthritis of her right ankle status post incision and drainage of the right ankle as well as left shoulder and currently discharged on IV ceftriaxone for total of 14 days.  She had workup for STDs and was negative for chlamydia and gonorrhea and HIV.   her blood cultures were positive for strep pneumonia.  She had workup for endocarditis including an transesophageal echocardiogram which did not show any growth  She will then switch to 2 weeks of oral antibiotics and follow outpatient with infectious disease as scheduled in 3 weeks  She is tolerating her lab regimen well labs are pending.  Hyponatremia secondary to hypovolemia.  Plan will be to monitor her BMPs in the TCU  Acute rhabdomyolysis with unclear etiology.  Acute encephalopathy felt to be metabolic.  She is still not back to baseline with an initial testing cognitive slums of 20/30 CT head was negative  Patient however personally feels she is back to baseline with no confusion.  Will await further cognitive testing nursing is still noticing intermittent episodes of confusion  Anemia chronic hemoglobin dropped down to 6.9 in the hospital and she was given 1 unit of blood transfusion.  She had additional workup showing low iron studies but normal B12 folic acid and negative Hemoccult and she is discharged on oral iron supplements.  CBC is pending  Chronic back pain with MRI workup was negative for any osteomyelitis or discitis continue with pain management  Now on scheduled Tylenol to minimize narcotic usage especially in light of intermittent confusion  Electrolyte imbalance with low potassium and magnesium  Depression on her home regimen rates her mood is  stable  Malnutrition low pre-albumin  Generalized weakness with deconditioning here for PT OT and rehab.  Actually doing well and she plans to discharge back home once she is able to get off her IV antibiotics.  Patient care plan was reviewed with her we will follow-up on the lab results she was advised to talk to the dietitian to monitor her intake and minimize risk of malnutrition while in the TCU.  Monitor patient closely in light of initial abnormal testing encephalopathy in the hospital and episodes of intermittent confusion which may partly be from her narcotic usage hopefully with Tylenol on board she will minimize the usage of narcotics and we can assess her more clearly.  Total time spent was 35 minutes, more than half of it was in face-to-face counseling regarding disease state, treatment, side effects, documentation, review of clinical data and coordination of care  Electronically signed by: IRAM Dickinson  This progress note was completed using Dragon software and there may be grammatical errors.

## 2021-07-20 NOTE — PROGRESS NOTES
Pharmacy Note - Admission Medication History    Pertinent Provider Information: Patient not the best historian this morning, not following conversation well.     ______________________________________________________________________    Prior To Admission (PTA) med list completed and updated in EMR.       PTA Med List   Medication Sig Note Last Dose     busPIRone (BUSPAR) 10 MG tablet Take 20 mg by mouth 2 (two) times a day.  8/16/2017 at am     FLUoxetine (PROZAC) 40 MG capsule Take 40 mg by mouth daily.  Past Week     gabapentin (NEURONTIN) 100 MG capsule Take 100-200 mg by mouth at bedtime as needed (for nerve pain).  Past Week     ibuprofen (ADVIL,MOTRIN) 200 MG tablet Take 400 mg by mouth every 6 (six) hours as needed for pain.  8/15/2017 at pm     methylPREDNISolone (MEDROL DOSEPACK) 4 mg tablet Take 4 mg by mouth daily. follow package directions 8/16/2017: Took last tablet this AM 8/16/2017 at am     rizatriptan (MAXALT) 10 MG tablet Take 10 mg by mouth as needed for migraine (may repeat x1 in 2 hours, maximum of 3 tablets/24 hours). May repeat in 2 hours if needed  Past Week     traMADol (ULTRAM) 50 mg tablet Take  mg by mouth every 6 (six) hours as needed for pain.  8/16/2017 at am       Information source(s): Patient    Summary of Changes to PTA Med List  New: All    Patient was asked about OTC/herbal products specifically.  PTA med list reflects this.    Based on the pharmacist s assessment, the PTA med list information appears somewhat reliable:due to poor historian    Patient appears compliant: No: reason :  Patient decision. Unclear why she has not taken her fluoxetine for a week.    Allergies were reviewed, assessed, and updated with the patient.      Patient does not use any multi-dose medications prior to admission.    Thank you,  Bianca Ramirez, PharmD  8/16/2017 8:14 AM

## 2021-07-20 NOTE — PROCEDURES
"Procedures by Almita Murray RN at 8/18/2017 11:46 AM     Author: Almita Murray RN Service: -- Author Type: Registered Nurse    Filed: 8/18/2017 11:47 AM Date of Service: 8/18/2017 11:46 AM Status: Signed    : Almita Murray RN (Registered Nurse)     Procedure Orders    1. Insert PICC [[id number]] ordered by Hilario Rubio MD at 08/18/17 1002           Procedures    1. PICC [[id number] (Custom)]             PICC Line Insertion Procedure Note  Pt. Name: Lauryn Raya  MRN:        [id number]    Procedure: Insertion of a  single Lumen  4 fr  Bard SOLO (valved) Power PICC, Lot number [id number]    Indications: antibiotics long term    Contraindications : none    Procedure Details   Patient identified with 2 identifiers and \"Time Out\" conducted.  .     Central line insertion bundle followed: hand hygeine performed prior to procedure, site cleansed with cholraprep, hat, mask, sterile gloves,sterile gown worn, patient draped with maximum barrier head to toe drape, sterile field maintained.    The vein was assessed and found to be compressible and of adequate size. 3 ml 1% Lidocaine administered sq to the insertion site. A 4 Fr PICC was inserted into the basilic vein of the right arm with ultrasound guidance. 1 attempt(s) required to access vein.   Catheter threaded without difficulty. Good blood return noted.    Modified Seldinger Technique used for insertion.    The 8 sharps that are included in the PICC insertion kit were accounted for and disposed of in the sharps container prior to breakdown of the sterile field.    Catheter secured with Statlock, biopatch and Tegaderm dressing applied.    Findings:  Total catheter length  38 cm, with 1 cm exposed. Mid upper arm circumference is 29 cm. Catheter was flushed with 20 cc NS. Patient  tolerated procedure well.    Tip placement verified 3CG technology . Tip placement in the SVC.      CLABSI prevention brochure left at bedside.    Patient's primary RN notified PICC " is ready for use.    Comments:          Almita Murray, RN    [facility]  [phone]

## 2021-07-20 NOTE — PROGRESS NOTES
ORTHOPEDIC L/E PROGRESS NOTE  Procedure(s):  OPEN IRRIGATION AND DEBRIDEMENT ANKLE  ARTHROSCOPIC IRRIGATION AND DEBRIDEMENT SHOULDER on 8/16/2017.   1 Day Post-Op    PAIN: moderate  NAUSEA: no    Lauryn states that her pain has improved today. Denies any pain in her hips.     Temp:  [97.4  F (36.3  C)-101.7  F (38.7  C)] 97.6  F (36.4  C)  Heart Rate:  [] 84  Resp:  [17-29] 18  BP: (106-140)/(69-85) 106/69    Lab Results   Component Value Date    HGB 7.5 (L) 08/17/2017    HGB 9.4 (L) 08/16/2017       EXAM   The patient is awake and alert.  Calves are soft and non-tender.   Sensation is intact to bilat UE and LE.  Dorsiflexion and plantar flexion is intact,  strength intact.  Dorsalis pedis and radial pulses intact.   No pain in groin or thigh with passive hip ROM bilat.   The incision is covered. Dressing C/D/I    ASSESSMENT  POD #1 s/p left shoulder I&D, right ankle I&D    PLAN  Anticoagulation addressed - ASA OQ  Continue with PT, OT.  Plan for Discharge: Likely home in 2-3 days    Celestina Avitia PA-C  [facility]

## 2021-07-20 NOTE — ADDENDUM NOTE
Addendum Note by Hilario Rubio MD at 9/20/2017  8:54 AM     Author: Hilario Rubio MD Service: -- Author Type: Physician    Filed: 9/20/2017  8:54 AM Encounter Date: 9/18/2017 Status: Signed    : Hilario Rubio MD (Physician)    Addended by: HILARIO RUBIO on: 9/20/2017 08:54 AM        Modules accepted: Orders

## 2021-07-20 NOTE — ANESTHESIA CARE TRANSFER NOTE
Last vitals:   Vitals:    08/16/17 1728   BP: 119/71   Pulse: (!) 107   Resp: 28   Temp: 37.5  C (99.5  F)   SpO2: 100%     Patient's level of consciousness is drowsy  Spontaneous respirations: yes  Maintains airway independently: yes  Dentition unchanged: yes  Oropharynx: oropharynx clear of all foreign objects    QCDR Measures:  ASA# 20 - Surgical Safety Checklist: WHO surgical safety checklist completed prior to induction  PQRS# 430 - Adult PONV Prevention: 4558F - Pt received => 2 anti-emetic agents (different classes) preop & intraop  ASA# 8 - Peds PONV Prevention: NA - Not pediatric patient, not GA or 2 or more risk factors NOT present  PQRS# 424 - [place]-op Temp Management: 4559F - At least one body temp DOCUMENTED => 35.5C or 95.9F within required timeframe  PQRS# 426 - PACU Transfer Protocol: - Transfer of care checklist used  ASA# 14 - Acute Post-op Pain: ASA14B - Patient did NOT experience pain >= 7 out of 10

## 2021-07-20 NOTE — ANESTHESIA PREPROCEDURE EVALUATION
Anesthesia Evaluation      Patient summary reviewed   No history of anesthetic complications     Airway   Mallampati: I  Neck ROM: full   Pulmonary - negative ROS and normal exam                          Cardiovascular - normal exam  Exercise tolerance: > or = 4 METS   Neuro/Psych    (+) neuromuscular disease,      Endo/Other - negative ROS      GI/Hepatic/Renal - negative ROS      Other findings: Polyarthropathy with septic joints (ankle aspirate showed pus, shoulder also involved) and very high CRP, Hg 9.4 (no etiology known yet), Ca 11.1, Na 120. Pt's confusion could be from sepsis, but am concerned the low Na+ is a factor.  Cr 0.86, K 3.3, WBC 16.5, .        Dental - normal exam                        Anesthesia Plan  Planned anesthetic: general endotracheal    ASA 3   Induction: intravenous   Anesthetic plan and risks discussed with: patient    Post-op plan: routine recovery

## 2021-07-20 NOTE — ANESTHESIA POSTPROCEDURE EVALUATION
Patient: Lauryn Raya  OPEN IRRIGATION AND DEBRIDEMENT ANKLE, ARTHROSCOPIC IRRIGATION AND DEBRIDEMENT SHOULDER  Anesthesia type: general    Patient location: PACU  Last vitals:   Vitals:    08/16/17 1820   BP: 122/73   Pulse: 91   Resp: 21   Temp: 37.2  C (99  F)   SpO2: 100%     Post vital signs: stable  Level of consciousness: awake and responds to simple questions  Post-anesthesia pain: pain controlled  Post-anesthesia nausea and vomiting: no  Pulmonary: unassisted, return to baseline  Cardiovascular: stable and blood pressure at baseline  Hydration: adequate  Anesthetic events: no    QCDR Measures:  ASA# 11 - [place]-op Cardiac Arrest: ASA11B - Patient did NOT experience unanticipated cardiac arrest  ASA# 12 - [place]-op Mortality Rate: ASA12B - Patient did NOT die  ASA# 13 - PACU Re-Intubation Rate: ASA13B - Patient did NOT require a new airway mgmt  ASA# 10 - Composite Anes Safety: ASA10A - No serious adverse event  ASA# 38 - New Corneal Injury: ASA38A - No new exposure keratitis or corneal abrasion in PACU    Additional Notes:

## 2021-07-20 NOTE — PROGRESS NOTES
Ortho Note:    Patient is currently awake and up in bed.  Feels better this morning.  No new complaints.      S/P right open ankle I&D and left shoulder arthroscopic I&D    Patient is awake and alert  Right ankle remains stable.  No drainage.  Dressing is clean.    Some swelling/edema along dorsum of right foot.  Encouraged frequent foot elevation.    Patient can wiggle toes.  She can do gentle ROM with right ankle  Left shoulder dressing is clean.  No drainage.    Appropriate level of left shoulder swelling.  Surrounding skin appears normal.  Good hand, wrist, and elbow ROM    POD #3 S/P right open ankle I&D and left shoulder arthroscopic I&D    PLAN:  Continue PT/OT              PICC line is placed               ID following              F/U with Dr. Macias in 2 weeks for postop visit              Discharge when ok with medical team        [facility]

## 2021-07-20 NOTE — PROGRESS NOTES
ORTHOPEDIC L/E PROGRESS NOTE  Procedure(s):  OPEN IRRIGATION AND DEBRIDEMENT ANKLE  ARTHROSCOPIC IRRIGATION AND DEBRIDEMENT SHOULDER on 8/16/2017.   1 Day Post-Op    PAIN: moderate  NAUSEA: no    Lauryn states that her pain has improved today. Had left hip pain earlier, states it comes and goes and is mainly posterior.      Temp:  [97.8  F (36.6  C)-98.6  F (37  C)] 98.5  F (36.9  C)  Heart Rate:  [69-90] 80  Resp:  [18-26] 18  BP: ()/(63-82) 122/80    Lab Results   Component Value Date    HGB 7.2 (L) 08/18/2017    HGB 7.3 (L) 08/17/2017    HGB 7.5 (L) 08/17/2017       EXAM   The patient is awake and alert.  Calves are soft and non-tender.   Sensation is intact to bilat UE and LE.  Dorsiflexion and plantar flexion is intact,  strength intact.  Dorsalis pedis and radial pulses intact.   No pain in groin or thigh with passive hip ROM bilat.   The incision is covered. Dressing C/D/I    ASSESSMENT  POD #2 s/p left shoulder I&D, right ankle I&D    PLAN  Anticoagulation addressed - ASA OQ  Continue with PT, OT.  Plan for Discharge: TCU vs home when medically stable    Celestina Avitia PA-C  [facility]

## 2021-07-20 NOTE — PROGRESS NOTES
Progress Note    Assessment/Plan  46 years old female  with past medical history of depression and chronic back pain who presents  to the emergency room for evaluation of bilateral shoulder pain, right hip pain and right foot pain.  She was admitted with      Multiple joint pain with right ankle septic joint  - Etiology is unclear, patient denies IV drug abuse  - Orthopedic consult is appreciated  - S/P incision and drainage of right ankle and left shoulder  - Empiric antibiotic with vancomycin and Zosyn  - Negative chlamydia and gonorrhea and HIV  - Positive blood culture for strep pneumonia  - ID consult is appreciated  - Negative transesophageal echo  for endocarditis     Sepsis   - Source is probably septic joints  - Positive blood culture for strep pneumonia  - Continue IV fluid and antibiotics  - ID consult is appreciated  - 2 weeks of IV antibiotics as per ID  - Place PICC line today      Hyponatremia  - Secondary to hypovolemia  - Improving with IV fluid but still low  - Continue to monitor sodium level  - CT head is unremarkable for acute changes      Rhabdomyolysis  - Etiology is unclear  - Resolved  - Continue IV fluids hydration      Acute encephalopathy  - Metabolic, secondary to the above  - Patient is more awake and today after D/C scheduled narcotics  - Continue supportive care  - Delirium order set  - CT head is unremarkable for acute changes     Anemia  - Acute on chronic  - Part is hemodilution secondary to aggressive IV hydration  - Low iron studies, normal vitamin B12, folic acid, and negative Hemoccult blood  - Start IV iron      Chronic back pain  - MRI is negative for osteomyelitis/discitis  - Pain control with as needed oxycodone  - Discontinue scheduled oxycodone as patient is very lethargic  - PT evaluation once medically      Hypokalemia  - Replace per protocol     Hypomagnesemia  - Replace per protocol      Depression  - Resume home medications    Weakness and deconditioning  - Secondary  to the above  - PT/OT evaluation  - Plan for TCU on discharge      Code status :  Full code     Discussed with family , patient, ID , nursing staff and discharge planner      Disposition Plan: Probably TCU  Barriers to Discharge: Sepsis, IV antibiotics    Principal Problem:    Septic arthritis  Active Problems:    Polyarthropathy    Sepsis, due to unspecified organism    Anemia, unspecified type    Hypomagnesemia      Subjective  Lauryn is feeling better today, more awake and engaging in conversation, denies any pain or shortness of breath, no fever or chills , low oral intake    Objective     Vital signs in last 24 hours  Temp:  [97.8  F (36.6  C)-98.6  F (37  C)] 98.5  F (36.9  C)  Heart Rate:  [69-90] 80  Resp:  [16-26] 18  BP: ()/(59-87) 122/80    Wt Readings from Last 3 Encounters:   08/16/17 1845 133 lb (60.3 kg)   08/16/17 0450 130 lb (59 kg)       Intake/Output last 3 shifts  I/O last 3 completed shifts:  In: 3646 [P.O.:840; I.V.:2106; IV Piggyback:700]  Out: 1650 [Urine:1650]  Intake/Output this shift:       Review of Systems   A 12 point comprehensive review of systems was negative except as noted.    Physical Exam  General: Well developed , sitting comfortable on bed , No apparent distress  Head: Normocephalic, atraumatic  Eyes: Pupils equal, round and reactive to light   Mouth: Mucus membranes moist  Neck: Supple, No JVD  Cardiovascular: Regular rate and rhythm, Normal S1, S2  Lungs: Clear to auscultation bilaterally, no wheeze.  Abdomen: Soft, Non-tender, not distended, Bowel sounds present  Extremities: No edema, no clubbing, dressing applied to right ankle and left shoulder  Skin: Warm and well-perfused without lesions.  Neurologic: lethargic,  face is symmetric, Moves all extremities equally  Psychiatry : appropriate with examiner          Pertinent Labs   Lab Results: personally reviewed.   Lab Results   Component Value Date     (L) 08/18/2017     (L) 08/17/2017     (L)  08/16/2017    K 3.5 08/18/2017    K 3.9 08/17/2017    K 3.3 (L) 08/16/2017    CO2 26 08/18/2017    CO2 25 08/17/2017    CO2 24 08/16/2017    BUN 12 08/18/2017    BUN 14 08/17/2017    BUN 15 08/16/2017    CREATININE 0.66 08/18/2017    CREATININE 0.76 08/17/2017    CREATININE 0.86 08/16/2017    CALCIUM 7.9 (L) 08/18/2017    CALCIUM 9.6 08/17/2017    CALCIUM 11.1 (H) 08/16/2017     Lab Results   Component Value Date    WBC 8.0 08/18/2017    WBC 8.9 08/17/2017    WBC 9.0 08/17/2017    HGB 7.2 (L) 08/18/2017    HGB 7.3 (L) 08/17/2017    HGB 7.5 (L) 08/17/2017    HCT 21.4 (L) 08/18/2017    HCT 21.8 (L) 08/17/2017    HCT 21.9 (L) 08/17/2017    MCV 98 08/18/2017    MCV 97 08/17/2017    MCV 95 08/17/2017     (L) 08/18/2017     (L) 08/17/2017     (L) 08/17/2017           Advanced Care Planning  Discharge Planning discussed with patient  Total time 35 minutes with >50% spent in counseling regarding plan and coordinating care.      Paulino Starks MD  Hospitalist  Pager [phone]

## 2023-03-27 NOTE — PROGRESS NOTES
[facility]      Facility:    [facility] [[id number]]  Code Status: FULL CODE      Chief Complaint/Reason for Visit:  Chief Complaint   Patient presents with     Review Of Multiple Medical Conditions       HPI:   Lauryn is a 46 y.o. female who is at transfer from [facility] on 8/24/17.  She has underlying history of depression and chronic back pain who presented to the hospital for evaluation of bilateral shoulder pain with hip and foot pain and acute confusion.  She apparently presented to her primary care physician 2 weeks prior with muscle skeletal aches and pains and was given symptomatic treatment.  Orthopedics saw this patient and she was diagnosed with tissue swelling and tibiotalar joint effusion which was felt to be from the setting of multiple septic joints and a toxic appearance.  Cultures grew hemolytic strep bacteremia.  So she went undergoing surgery with open irrigation debridement of her right ankle through arthroscopic irrigation and debridement of her left shoulder on 8/16/17.  The exact etiology of her infections remains unclear.  She denies polysubstance abuse.  INDY was negative for vegetations and is currently taking 2 weeks of IV ceftriaxone, will be following up with ID weekly per switching to oral antibiotics.  Interestingly her albumin was 1.8 on presentation with elevated AST, which may indicate synthetic disease.    TCU:  Today I find a 46-year-old female lying in bed with minimal issues.  She states her pain is well controlled through current plan of care, as she has scheduled tylenol.  She also tells me that she does not sleep that well.  So I will start melatonin 3 mg at at bedtime.  Today she believes her sleep is adequate.  Also I asked her about her previous diet her low albumin she states.  She states that she has not eaten very well.  I will order Magic cup daily with a dietitian to consult.  Also noticed that her magnesium  is low at 1.4, so I will start magnesium supplementation 250 mg daily.  Previously on 8/25 her WBC count was 19.9, with a subsequent recheck on 8/28 of 15.5 and a CRP of 1.  Subsequent right ankle and left shoulder x-rays not evident for infection.  Today less edema on right ankle.  Scheduled for end of IV Rocephin on 9/3 and then switch to p.o.  ID following.    Patient denies pain, headache, chest pain, numbness or tingling, shortest of breath, eating or swallowing concerns, nausea or vomiting, diarrhea or bowel abnormalities, or no new integumentary concerns today.  ID following weekly.      Past Medical History:  Past Medical History:   Diagnosis Date     Anxiety      Depression      Septic arthritis            Surgical History:  Past Surgical History:   Procedure Laterality Date     PICC  8/18/2017            Family History:   No family history on file.    Social History:    Social History     Social History     Marital status:      Spouse name: N/A     Number of children: N/A     Years of education: N/A     Social History Main Topics     Smoking status: Never Smoker     Smokeless tobacco: Never Used     Alcohol use Yes      Comment: occasional     Drug use: No     Sexual activity: Not on file     Other Topics Concern     Not on file     Social History Narrative          Review of Systems   Constitutional: Positive for activity change, appetite change and fatigue. Negative for diaphoresis.        No acute concerns   HENT: Negative for congestion, facial swelling and hearing loss.    Eyes: Negative for photophobia and visual disturbance.   Respiratory: Negative for apnea, shortness of breath and wheezing.    Cardiovascular: Negative for chest pain.   Endocrine: Negative.    Genitourinary: Negative for dysuria and frequency.   Musculoskeletal: Negative for back pain and joint swelling.        Less painful ambulation   Skin: Positive for wound.        Dressing has old bloody drainage, dressing change daily    Allergic/Immunologic: Negative.    Neurological: Negative for dizziness, seizures, syncope, weakness and headaches.   Hematological: Negative.    Psychiatric/Behavioral: Positive for confusion. Negative for agitation, decreased concentration and hallucinations. The patient is not nervous/anxious.         Less periodic confusion per nursing       Vitals:    08/30/17 0929   BP: 125/77   Pulse: 86   Resp: 20   Temp: 98.5  F (36.9  C)   SpO2: 98%   Weight: 124 lb 9.6 oz (56.5 kg)       Physical Exam   Constitutional: She is oriented to person, place, and time. She appears well-developed. No distress.   No acute concerns   HENT:   Head: Normocephalic and atraumatic.   Mouth/Throat: Oropharynx is clear and moist.   Eyes: Pupils are equal, round, and reactive to light. Right eye exhibits no discharge. Left eye exhibits no discharge.   Neck: Normal range of motion. Neck supple.   Cardiovascular: Normal rate, regular rhythm and normal heart sounds.  Exam reveals no gallop.    No murmur heard.  Pulmonary/Chest: Effort normal and breath sounds normal. She has no wheezes. She has no rales.   CTA   Abdominal: Soft. Bowel sounds are normal. She exhibits no distension. There is no tenderness.   No diarrhea or constipation   Genitourinary:   Genitourinary Comments: Deferred   Musculoskeletal:   Painful ambulation on the right ankle   Neurological: She is alert and oriented to person, place, and time.   Seems to be baseline   Skin: Skin is warm and dry. She is not diaphoretic.   Right ankle and left shoulder dressing clean dry and intact   Psychiatric:   Has depression without anxiety   Nursing note and vitals reviewed.      Medication List:  Current Outpatient Prescriptions   Medication Sig     acetaminophen (TYLENOL) 325 MG tablet Take 2 tablets (650 mg total) by mouth every 6 (six) hours as needed.     aspirin 325 MG tablet Take 1 tablet (325 mg total) by mouth daily.     busPIRone (BUSPAR) 10 MG tablet Take 20 mg by mouth 2  (two) times a day.     [START ON 9/1/2017] cefdinir (OMNICEF) 300 MG capsule Take 1 capsule (300 mg total) by mouth 2 (two) times a day for 14 days. Start after finish IV antibiotics     cefTRIAXone 2 g in NaCl 0.9 % 0.9 % 50 mL IVPB Infuse 2 g into a venous catheter daily for 14 days.     ferrous sulfate (FERROUSUL) 325 (65 FE) MG tablet Take 1 tablet (325 mg total) by mouth daily with breakfast.     FLUoxetine (PROZAC) 40 MG capsule Take 40 mg by mouth daily.     gabapentin (NEURONTIN) 100 MG capsule Take 100-200 mg by mouth at bedtime as needed (for nerve pain).     ibuprofen (ADVIL,MOTRIN) 200 MG tablet Take 400 mg by mouth every 6 (six) hours as needed for pain.     magnesium oxide 250 mg Tab Take by mouth daily.     melatonin 3 mg Tab tablet Take 3 mg by mouth at bedtime.     omeprazole (PRILOSEC) 20 MG capsule Take 1 capsule (20 mg total) by mouth daily before breakfast.     oxyCODONE (ROXICODONE) 5 MG immediate release tablet Take 1-2 tablets (5-10 mg total) by mouth every 4 (four) hours as needed.     rizatriptan (MAXALT) 10 MG tablet Take 10 mg by mouth as needed for migraine (may repeat x1 in 2 hours, maximum of 3 tablets/24 hours). May repeat in 2 hours if needed     senna-docusate (PERICOLACE) 8.6-50 mg tablet Take 1 tablet by mouth 2 (two) times a day.       Labs:  Recent Results (from the past 168 hour(s))   Creatinine   Result Value Ref Range    Creatinine 0.81 0.60 - 1.10 mg/dL    GFR MDRD Af Amer >60 >60 mL/min/1.73m2    GFR MDRD Non Af Amer >60 >60 mL/min/1.73m2   AST (SGOT)   Result Value Ref Range    AST 37 0 - 40 U/L   HM1 (CBC with Diff)   Result Value Ref Range    WBC 19.9 (H) 4.0 - 11.0 thou/uL    RBC 2.77 (L) 3.80 - 5.40 mill/uL    Hemoglobin 9.0 (L) 12.0 - 16.0 g/dL    Hematocrit 27.7 (L) 35.0 - 47.0 %     80 - 100 fL    MCH 32.5 27.0 - 34.0 pg    MCHC 32.5 32.0 - 36.0 g/dL    RDW 13.7 11.0 - 14.5 %    Platelets 226 140 - 440 thou/uL    MPV 9.7 8.5 - 12.5 fL   Manual Differential    Result Value Ref Range    Total Neutrophils % 61 50 - 70 %    Lymphocytes % 20 20 - 40 %    Monocytes % 5 2 - 10 %    Eosinophils %  2 0 - 6 %    Basophils % 0 0 - 2 %    Metamyelocytes % 7 (H) <=1 %    Myelocytes % 5 (H) <=1 %    Other Cells % 2 (H) <=0 %    Total Neutrophils Absolute 12.0 (H) 2.0 - 7.7 thou/ul    Lymphocytes Absolute 4.0 0.8 - 4.4 thou/uL    Monocytes Absolute 0.9 0.0 - 0.9 thou/uL    Eosinophils Absolute 0.4 0.0 - 0.4 thou/uL    Basophils Absolute 0.0 0.0 - 0.2 thou/uL    Metamyelocytes Absolute 1.4 (H) <=0.1 thou/uL    Myelocytes Absolute 0.9 (H) <=0.1 thou/uL    Other Cells Absolute 0.3 0.0-<1.0 thou/uL    Manual nRBC per 100 Cells 1 (H) 0-<1    Reactive Lymphocytes 1+ (!) Negative    Platelet Estimate Normal Normal    Polychromasia 1+ (!) Negative   C-Reactive Protein (CRP)   Result Value Ref Range    CRP 1.5 (H) 0.0 - 0.8 mg/dL   HM1 (CBC with Diff)   Result Value Ref Range    WBC 15.5 (H) 4.0 - 11.0 thou/uL    RBC 2.38 (L) 3.80 - 5.40 mill/uL    Hemoglobin 7.7 (L) 12.0 - 16.0 g/dL    Hematocrit 23.8 (L) 35.0 - 47.0 %     80 - 100 fL    MCH 32.4 27.0 - 34.0 pg    MCHC 32.4 32.0 - 36.0 g/dL    RDW 14.0 11.0 - 14.5 %    Platelets 272 140 - 440 thou/uL    MPV 8.9 8.5 - 12.5 fL   Manual Differential   Result Value Ref Range    Total Neutrophils % 66 50 - 70 %    Lymphocytes % 24 20 - 40 %    Monocytes % 6 2 - 10 %    Eosinophils %  0 0 - 6 %    Basophils % 1 0 - 2 %    Metamyelocytes % 2 (H) <=1 %    Myelocytes % 1 <=1 %    Total Neutrophils Absolute 10.2 (H) 2.0 - 7.7 thou/ul    Lymphocytes Absolute 3.7 0.8 - 4.4 thou/uL    Monocytes Absolute 0.9 0.0 - 0.9 thou/uL    Eosinophils Absolute 0.0 0.0 - 0.4 thou/uL    Basophils Absolute 0.2 0.0 - 0.2 thou/uL    Metamyelocytes Absolute 0.3 (H) <=0.1 thou/uL    Myelocytes Absolute 0.2 (H) <=0.1 thou/uL    Platelet Estimate Normal Normal    Polychromasia 1+ (!) Negative       Assessment:    ICD-10-CM    1. Malnutrition E46    2. Adjustment  insomnia F51.02    3. Weakness R53.1    4. Hypomagnesemia E83.42    5. Pneumococcal arthritis of right ankle M00.171        Plan:  1. Albumin 1.2 and pre-albumin 10.4, dietitian following, continue magic cup  2. No concern  3. Continue therapy  4. Mag supplementation  5. ID following, rocephin IV until 9/3      The care plan has been reviewed and all orders signed. Changes to care plan, if any, as noted. Otherwise, continue care plan of care.      Electronically signed by: Edenilson Lopez NP   4 = No assist / stand by assistance